# Patient Record
Sex: FEMALE | Race: WHITE | NOT HISPANIC OR LATINO | Employment: FULL TIME | URBAN - METROPOLITAN AREA
[De-identification: names, ages, dates, MRNs, and addresses within clinical notes are randomized per-mention and may not be internally consistent; named-entity substitution may affect disease eponyms.]

---

## 2017-04-22 ENCOUNTER — CONVERSION ENCOUNTER (OUTPATIENT)
Dept: FAMILY MEDICINE CLINIC | Facility: CLINIC | Age: 27
End: 2017-04-22

## 2017-04-22 LAB
ALBUMIN SERPL-MCNC: 4.5 G/DL (ref 3.6–5.1)
ALBUMIN/GLOB SERPL: ABNORMAL {RATIO} (ref 1–2.5)
ALP SERPL-CCNC: 348 UNITS/L (ref 33–115)
ALT SERPL-CCNC: 275 UNITS/L (ref 6–29)
AST SERPL-CCNC: 85 UNITS/L (ref 10–30)
BILIRUB SERPL-MCNC: 1 MG/DL (ref 0.2–1.2)
BUN SERPL-MCNC: 7 MG/DL (ref 7–25)
BUN/CREAT SERPL: ABNORMAL (ref 6–22)
CALCIUM SERPL-MCNC: 10 MG/DL (ref 8.6–10.2)
CHLORIDE SERPL-SCNC: 100 MMOL/L (ref 98–110)
CO2 CONTENT VENOUS: 29 MMOL/L (ref 20–31)
CONV TOTAL PROTEIN: 7.9 G/DL (ref 6.1–8.1)
CREAT UR-MCNC: 0.72 MG/DL (ref 0.5–1.1)
CRP SERPL-MCNC: 0.57 MG/DL
GLOBULIN UR ELPH-MCNC: ABNORMAL G/DL (ref 1.9–3.7)
GLUCOSE SERPL-MCNC: 110 MG/DL (ref 65–99)
POTASSIUM SERPL-SCNC: 4.8 MMOL/L (ref 3.5–5.3)
SODIUM SERPL-SCNC: 139 MMOL/L (ref 135–146)

## 2019-07-02 LAB
EXTERNAL HEPATITIS B SURFACE ANTIGEN: NEGATIVE
EXTERNAL HEPATITIS C AB: NORMAL
EXTERNAL RUBELLA QUALITATIVE: NORMAL
EXTERNAL SYPHILIS RPR SCREEN: NORMAL
HIV1 P24 AG SERPL QL IA: NORMAL

## 2020-01-15 LAB — EXTERNAL GROUP B STREP ANTIGEN: NORMAL

## 2020-02-04 ENCOUNTER — ANESTHESIA (OUTPATIENT)
Dept: LABOR AND DELIVERY | Facility: HOSPITAL | Age: 30
End: 2020-02-04

## 2020-02-04 ENCOUNTER — HOSPITAL ENCOUNTER (OUTPATIENT)
Dept: LABOR AND DELIVERY | Facility: HOSPITAL | Age: 30
Discharge: HOME | End: 2020-02-04

## 2020-02-04 ENCOUNTER — HOSPITAL ENCOUNTER (INPATIENT)
Facility: HOSPITAL | Age: 30
LOS: 3 days | Discharge: HOME | End: 2020-02-07
Attending: OBSTETRICS & GYNECOLOGY | Admitting: OBSTETRICS & GYNECOLOGY

## 2020-02-04 ENCOUNTER — ANESTHESIA EVENT (OUTPATIENT)
Dept: LABOR AND DELIVERY | Facility: HOSPITAL | Age: 30
End: 2020-02-04

## 2020-02-04 DIAGNOSIS — Z37.9 NORMAL LABOR: Primary | ICD-10-CM

## 2020-02-04 LAB
ABO GROUP BLD: NORMAL
BLD GP AB SCN SERPL QL: NEGATIVE
DEPRECATED RDW RBC AUTO: 49.3 FL (ref 37–54)
ERYTHROCYTE [DISTWIDTH] IN BLOOD BY AUTOMATED COUNT: 15.6 % (ref 12.3–15.4)
HCT VFR BLD AUTO: 35.9 % (ref 34–46.6)
HGB BLD-MCNC: 11.8 G/DL (ref 12–15.9)
MCH RBC QN AUTO: 28.5 PG (ref 26.6–33)
MCHC RBC AUTO-ENTMCNC: 32.9 G/DL (ref 31.5–35.7)
MCV RBC AUTO: 86.7 FL (ref 79–97)
PLATELET # BLD AUTO: 236 10*3/MM3 (ref 140–450)
PMV BLD AUTO: 9.9 FL (ref 6–12)
RBC # BLD AUTO: 4.14 10*6/MM3 (ref 3.77–5.28)
RH BLD: POSITIVE
T&S EXPIRATION DATE: NORMAL
WBC NRBC COR # BLD: 9.14 10*3/MM3 (ref 3.4–10.8)

## 2020-02-04 PROCEDURE — 86850 RBC ANTIBODY SCREEN: CPT | Performed by: OBSTETRICS & GYNECOLOGY

## 2020-02-04 PROCEDURE — 86901 BLOOD TYPING SEROLOGIC RH(D): CPT | Performed by: OBSTETRICS & GYNECOLOGY

## 2020-02-04 PROCEDURE — 85027 COMPLETE CBC AUTOMATED: CPT | Performed by: OBSTETRICS & GYNECOLOGY

## 2020-02-04 PROCEDURE — 86901 BLOOD TYPING SEROLOGIC RH(D): CPT

## 2020-02-04 PROCEDURE — 86920 COMPATIBILITY TEST SPIN: CPT

## 2020-02-04 PROCEDURE — 25010000002 VANCOMYCIN PER 500 MG: Performed by: OBSTETRICS & GYNECOLOGY

## 2020-02-04 PROCEDURE — 86900 BLOOD TYPING SEROLOGIC ABO: CPT | Performed by: OBSTETRICS & GYNECOLOGY

## 2020-02-04 PROCEDURE — 86900 BLOOD TYPING SEROLOGIC ABO: CPT

## 2020-02-04 RX ORDER — SODIUM CHLORIDE 0.9 % (FLUSH) 0.9 %
10 SYRINGE (ML) INJECTION AS NEEDED
Status: DISCONTINUED | OUTPATIENT
Start: 2020-02-04 | End: 2020-02-05

## 2020-02-04 RX ORDER — PRENATAL VIT NO.126/IRON/FOLIC 28MG-0.8MG
1 TABLET ORAL DAILY
COMMUNITY

## 2020-02-04 RX ORDER — SODIUM CHLORIDE, SODIUM LACTATE, POTASSIUM CHLORIDE, CALCIUM CHLORIDE 600; 310; 30; 20 MG/100ML; MG/100ML; MG/100ML; MG/100ML
125 INJECTION, SOLUTION INTRAVENOUS CONTINUOUS
Status: CANCELLED | OUTPATIENT
Start: 2020-02-04

## 2020-02-04 RX ORDER — ASPIRIN 81 MG/1
81 TABLET, CHEWABLE ORAL DAILY
COMMUNITY
End: 2020-02-07 | Stop reason: HOSPADM

## 2020-02-04 RX ORDER — VANCOMYCIN HYDROCHLORIDE 1 G/200ML
1000 INJECTION, SOLUTION INTRAVENOUS EVERY 12 HOURS
Status: COMPLETED | OUTPATIENT
Start: 2020-02-04 | End: 2020-02-05

## 2020-02-04 RX ORDER — OXYTOCIN-SODIUM CHLORIDE 0.9% IV SOLN 30 UNIT/500ML 30-0.9/5 UT/ML-%
1-30 SOLUTION INTRAVENOUS
Status: DISCONTINUED | OUTPATIENT
Start: 2020-02-04 | End: 2020-02-05

## 2020-02-04 RX ORDER — LIDOCAINE HYDROCHLORIDE 10 MG/ML
5 INJECTION, SOLUTION EPIDURAL; INFILTRATION; INTRACAUDAL; PERINEURAL AS NEEDED
Status: DISCONTINUED | OUTPATIENT
Start: 2020-02-04 | End: 2020-02-05

## 2020-02-04 RX ORDER — SODIUM CHLORIDE 0.9 % (FLUSH) 0.9 %
3 SYRINGE (ML) INJECTION EVERY 12 HOURS SCHEDULED
Status: DISCONTINUED | OUTPATIENT
Start: 2020-02-04 | End: 2020-02-05

## 2020-02-04 RX ORDER — SODIUM CHLORIDE, SODIUM LACTATE, POTASSIUM CHLORIDE, CALCIUM CHLORIDE 600; 310; 30; 20 MG/100ML; MG/100ML; MG/100ML; MG/100ML
125 INJECTION, SOLUTION INTRAVENOUS CONTINUOUS
Status: DISCONTINUED | OUTPATIENT
Start: 2020-02-04 | End: 2020-02-05

## 2020-02-04 RX ADMIN — OXYTOCIN 2 MILLI-UNITS/MIN: 10 INJECTION, SOLUTION INTRAMUSCULAR; INTRAVENOUS at 22:15

## 2020-02-04 RX ADMIN — SODIUM CHLORIDE, POTASSIUM CHLORIDE, SODIUM LACTATE AND CALCIUM CHLORIDE 1000 ML: 600; 310; 30; 20 INJECTION, SOLUTION INTRAVENOUS at 21:16

## 2020-02-04 RX ADMIN — SODIUM CHLORIDE, POTASSIUM CHLORIDE, SODIUM LACTATE AND CALCIUM CHLORIDE 125 ML/HR: 600; 310; 30; 20 INJECTION, SOLUTION INTRAVENOUS at 22:40

## 2020-02-04 RX ADMIN — VANCOMYCIN HYDROCHLORIDE 1000 MG: 1 INJECTION, SOLUTION INTRAVENOUS at 22:18

## 2020-02-05 LAB
APTT PPP: 25.2 SECONDS (ref 22.7–35.4)
BASOPHILS # BLD AUTO: 0.02 10*3/MM3 (ref 0–0.2)
BASOPHILS NFR BLD AUTO: 0.2 % (ref 0–1.5)
DEPRECATED RDW RBC AUTO: 47.9 FL (ref 37–54)
EOSINOPHIL # BLD AUTO: 0.02 10*3/MM3 (ref 0–0.4)
EOSINOPHIL NFR BLD AUTO: 0.2 % (ref 0.3–6.2)
ERYTHROCYTE [DISTWIDTH] IN BLOOD BY AUTOMATED COUNT: 15.2 % (ref 12.3–15.4)
FIBRINOGEN PPP-MCNC: 555 MG/DL (ref 219–464)
HCT VFR BLD AUTO: 33.6 % (ref 34–46.6)
HGB BLD-MCNC: 11.3 G/DL (ref 12–15.9)
IMM GRANULOCYTES # BLD AUTO: 0.28 10*3/MM3 (ref 0–0.05)
IMM GRANULOCYTES NFR BLD AUTO: 2.3 % (ref 0–0.5)
INR PPP: 0.93 (ref 0.9–1.1)
LYMPHOCYTES # BLD AUTO: 0.7 10*3/MM3 (ref 0.7–3.1)
LYMPHOCYTES NFR BLD AUTO: 5.8 % (ref 19.6–45.3)
MCH RBC QN AUTO: 29 PG (ref 26.6–33)
MCHC RBC AUTO-ENTMCNC: 33.6 G/DL (ref 31.5–35.7)
MCV RBC AUTO: 86.2 FL (ref 79–97)
MONOCYTES # BLD AUTO: 0.39 10*3/MM3 (ref 0.1–0.9)
MONOCYTES NFR BLD AUTO: 3.2 % (ref 5–12)
NEUTROPHILS # BLD AUTO: 10.69 10*3/MM3 (ref 1.7–7)
NEUTROPHILS NFR BLD AUTO: 88.3 % (ref 42.7–76)
NRBC BLD AUTO-RTO: 0.1 /100 WBC (ref 0–0.2)
PLATELET # BLD AUTO: 189 10*3/MM3 (ref 140–450)
PMV BLD AUTO: 9.8 FL (ref 6–12)
PROTHROMBIN TIME: 12.2 SECONDS (ref 11.7–14.2)
RBC # BLD AUTO: 3.9 10*6/MM3 (ref 3.77–5.28)
WBC NRBC COR # BLD: 12.1 10*3/MM3 (ref 3.4–10.8)

## 2020-02-05 PROCEDURE — 25010000002 METHYLERGONOVINE MALEATE PER 0.2 MG: Performed by: OBSTETRICS & GYNECOLOGY

## 2020-02-05 PROCEDURE — 86900 BLOOD TYPING SEROLOGIC ABO: CPT

## 2020-02-05 PROCEDURE — 85610 PROTHROMBIN TIME: CPT | Performed by: OBSTETRICS & GYNECOLOGY

## 2020-02-05 PROCEDURE — 0KQM0ZZ REPAIR PERINEUM MUSCLE, OPEN APPROACH: ICD-10-PCS | Performed by: OBSTETRICS & GYNECOLOGY

## 2020-02-05 PROCEDURE — C1755 CATHETER, INTRASPINAL: HCPCS | Performed by: ANESTHESIOLOGY

## 2020-02-05 PROCEDURE — 25010000002 VANCOMYCIN PER 500 MG: Performed by: OBSTETRICS & GYNECOLOGY

## 2020-02-05 PROCEDURE — 25010000002 MORPHINE PER 10 MG

## 2020-02-05 PROCEDURE — 10907ZC DRAINAGE OF AMNIOTIC FLUID, THERAPEUTIC FROM PRODUCTS OF CONCEPTION, VIA NATURAL OR ARTIFICIAL OPENING: ICD-10-PCS | Performed by: OBSTETRICS & GYNECOLOGY

## 2020-02-05 PROCEDURE — 85025 COMPLETE CBC W/AUTO DIFF WBC: CPT | Performed by: OBSTETRICS & GYNECOLOGY

## 2020-02-05 PROCEDURE — C1755 CATHETER, INTRASPINAL: HCPCS

## 2020-02-05 PROCEDURE — 85384 FIBRINOGEN ACTIVITY: CPT | Performed by: OBSTETRICS & GYNECOLOGY

## 2020-02-05 PROCEDURE — 85730 THROMBOPLASTIN TIME PARTIAL: CPT | Performed by: OBSTETRICS & GYNECOLOGY

## 2020-02-05 PROCEDURE — 86901 BLOOD TYPING SEROLOGIC RH(D): CPT

## 2020-02-05 RX ORDER — PROMETHAZINE HYDROCHLORIDE 25 MG/1
12.5 SUPPOSITORY RECTAL EVERY 6 HOURS PRN
Status: DISCONTINUED | OUTPATIENT
Start: 2020-02-05 | End: 2020-02-07 | Stop reason: HOSPADM

## 2020-02-05 RX ORDER — IBUPROFEN 800 MG/1
800 TABLET ORAL EVERY 8 HOURS PRN
Status: DISCONTINUED | OUTPATIENT
Start: 2020-02-05 | End: 2020-02-07 | Stop reason: HOSPADM

## 2020-02-05 RX ORDER — OXYCODONE AND ACETAMINOPHEN 10; 325 MG/1; MG/1
1 TABLET ORAL EVERY 4 HOURS PRN
Status: DISCONTINUED | OUTPATIENT
Start: 2020-02-05 | End: 2020-02-07 | Stop reason: HOSPADM

## 2020-02-05 RX ORDER — MISOPROSTOL 200 UG/1
800 TABLET ORAL AS NEEDED
Status: DISCONTINUED | OUTPATIENT
Start: 2020-02-05 | End: 2020-02-05

## 2020-02-05 RX ORDER — LIDOCAINE HYDROCHLORIDE 10 MG/ML
10 INJECTION, SOLUTION INFILTRATION; PERINEURAL ONCE
Status: COMPLETED | OUTPATIENT
Start: 2020-02-05 | End: 2020-02-05

## 2020-02-05 RX ORDER — VANCOMYCIN HYDROCHLORIDE 1 G/200ML
1000 INJECTION, SOLUTION INTRAVENOUS ONCE
Status: COMPLETED | OUTPATIENT
Start: 2020-02-05 | End: 2020-02-05

## 2020-02-05 RX ORDER — TRANEXAMIC ACID 100 MG/ML
INJECTION, SOLUTION INTRAVENOUS
Status: COMPLETED
Start: 2020-02-05 | End: 2020-02-05

## 2020-02-05 RX ORDER — ONDANSETRON 4 MG/1
4 TABLET, FILM COATED ORAL EVERY 8 HOURS PRN
Status: DISCONTINUED | OUTPATIENT
Start: 2020-02-05 | End: 2020-02-07 | Stop reason: HOSPADM

## 2020-02-05 RX ORDER — ONDANSETRON 2 MG/ML
4 INJECTION INTRAMUSCULAR; INTRAVENOUS ONCE AS NEEDED
Status: DISCONTINUED | OUTPATIENT
Start: 2020-02-05 | End: 2020-02-05

## 2020-02-05 RX ORDER — ERYTHROMYCIN 5 MG/G
OINTMENT OPHTHALMIC
Status: DISPENSED
Start: 2020-02-05 | End: 2020-02-05

## 2020-02-05 RX ORDER — HYDROXYZINE 50 MG/1
50 TABLET, FILM COATED ORAL NIGHTLY PRN
Status: DISCONTINUED | OUTPATIENT
Start: 2020-02-05 | End: 2020-02-07 | Stop reason: HOSPADM

## 2020-02-05 RX ORDER — EPHEDRINE SULFATE 50 MG/ML
5 INJECTION, SOLUTION INTRAVENOUS AS NEEDED
Status: DISCONTINUED | OUTPATIENT
Start: 2020-02-05 | End: 2020-02-05

## 2020-02-05 RX ORDER — PHYTONADIONE 1 MG/.5ML
INJECTION, EMULSION INTRAMUSCULAR; INTRAVENOUS; SUBCUTANEOUS
Status: DISPENSED
Start: 2020-02-05 | End: 2020-02-05

## 2020-02-05 RX ORDER — OXYTOCIN-SODIUM CHLORIDE 0.9% IV SOLN 30 UNIT/500ML 30-0.9/5 UT/ML-%
999 SOLUTION INTRAVENOUS ONCE
Status: DISCONTINUED | OUTPATIENT
Start: 2020-02-05 | End: 2020-02-05 | Stop reason: HOSPADM

## 2020-02-05 RX ORDER — TRANEXAMIC ACID 100 MG/ML
1000 INJECTION, SOLUTION INTRAVENOUS ONCE
Status: COMPLETED | OUTPATIENT
Start: 2020-02-05 | End: 2020-02-05

## 2020-02-05 RX ORDER — OXYTOCIN-SODIUM CHLORIDE 0.9% IV SOLN 30 UNIT/500ML 30-0.9/5 UT/ML-%
250 SOLUTION INTRAVENOUS CONTINUOUS
Status: ACTIVE | OUTPATIENT
Start: 2020-02-05 | End: 2020-02-05

## 2020-02-05 RX ORDER — ONDANSETRON 2 MG/ML
4 INJECTION INTRAMUSCULAR; INTRAVENOUS EVERY 6 HOURS PRN
Status: DISCONTINUED | OUTPATIENT
Start: 2020-02-05 | End: 2020-02-07 | Stop reason: HOSPADM

## 2020-02-05 RX ORDER — MORPHINE SULFATE 2 MG/ML
INJECTION, SOLUTION INTRAMUSCULAR; INTRAVENOUS
Status: COMPLETED
Start: 2020-02-05 | End: 2020-02-05

## 2020-02-05 RX ORDER — DIPHENHYDRAMINE HYDROCHLORIDE 50 MG/ML
12.5 INJECTION INTRAMUSCULAR; INTRAVENOUS EVERY 8 HOURS PRN
Status: DISCONTINUED | OUTPATIENT
Start: 2020-02-05 | End: 2020-02-05

## 2020-02-05 RX ORDER — CARBOPROST TROMETHAMINE 250 UG/ML
250 INJECTION, SOLUTION INTRAMUSCULAR AS NEEDED
Status: DISCONTINUED | OUTPATIENT
Start: 2020-02-05 | End: 2020-02-05

## 2020-02-05 RX ORDER — METHYLERGONOVINE MALEATE 0.2 MG/ML
200 INJECTION INTRAVENOUS ONCE AS NEEDED
Status: COMPLETED | OUTPATIENT
Start: 2020-02-05 | End: 2020-02-05

## 2020-02-05 RX ORDER — PROMETHAZINE HYDROCHLORIDE 25 MG/ML
12.5 INJECTION, SOLUTION INTRAMUSCULAR; INTRAVENOUS EVERY 6 HOURS PRN
Status: DISCONTINUED | OUTPATIENT
Start: 2020-02-05 | End: 2020-02-07 | Stop reason: HOSPADM

## 2020-02-05 RX ORDER — LANOLIN
CREAM (ML) TOPICAL
Status: DISCONTINUED | OUTPATIENT
Start: 2020-02-05 | End: 2020-02-07 | Stop reason: HOSPADM

## 2020-02-05 RX ORDER — DOCUSATE SODIUM 100 MG/1
100 CAPSULE, LIQUID FILLED ORAL 2 TIMES DAILY
Status: DISCONTINUED | OUTPATIENT
Start: 2020-02-05 | End: 2020-02-07 | Stop reason: HOSPADM

## 2020-02-05 RX ORDER — CALCIUM CARBONATE 200(500)MG
2 TABLET,CHEWABLE ORAL 3 TIMES DAILY PRN
Status: DISCONTINUED | OUTPATIENT
Start: 2020-02-05 | End: 2020-02-07 | Stop reason: HOSPADM

## 2020-02-05 RX ORDER — MORPHINE SULFATE 1 MG/ML
1 INJECTION, SOLUTION EPIDURAL; INTRATHECAL; INTRAVENOUS ONCE
Status: DISCONTINUED | OUTPATIENT
Start: 2020-02-05 | End: 2020-02-05

## 2020-02-05 RX ORDER — MISOPROSTOL 200 UG/1
600 TABLET ORAL ONCE AS NEEDED
Status: DISCONTINUED | OUTPATIENT
Start: 2020-02-05 | End: 2020-02-07 | Stop reason: HOSPADM

## 2020-02-05 RX ORDER — OXYCODONE HYDROCHLORIDE AND ACETAMINOPHEN 5; 325 MG/1; MG/1
1 TABLET ORAL EVERY 4 HOURS PRN
Status: DISCONTINUED | OUTPATIENT
Start: 2020-02-05 | End: 2020-02-07 | Stop reason: HOSPADM

## 2020-02-05 RX ORDER — PROMETHAZINE HYDROCHLORIDE 25 MG/1
25 TABLET ORAL EVERY 6 HOURS PRN
Status: DISCONTINUED | OUTPATIENT
Start: 2020-02-05 | End: 2020-02-07 | Stop reason: HOSPADM

## 2020-02-05 RX ORDER — FAMOTIDINE 10 MG/ML
20 INJECTION, SOLUTION INTRAVENOUS ONCE AS NEEDED
Status: DISCONTINUED | OUTPATIENT
Start: 2020-02-05 | End: 2020-02-05

## 2020-02-05 RX ORDER — OXYTOCIN-SODIUM CHLORIDE 0.9% IV SOLN 30 UNIT/500ML 30-0.9/5 UT/ML-%
125 SOLUTION INTRAVENOUS CONTINUOUS PRN
Status: COMPLETED | OUTPATIENT
Start: 2020-02-05 | End: 2020-02-05

## 2020-02-05 RX ORDER — MORPHINE SULFATE 2 MG/ML
2 INJECTION, SOLUTION INTRAMUSCULAR; INTRAVENOUS ONCE
Status: COMPLETED | OUTPATIENT
Start: 2020-02-05 | End: 2020-02-05

## 2020-02-05 RX ADMIN — IBUPROFEN 800 MG: 800 TABLET, FILM COATED ORAL at 22:04

## 2020-02-05 RX ADMIN — Medication: at 19:11

## 2020-02-05 RX ADMIN — MISOPROSTOL 800 MCG: 200 TABLET ORAL at 11:45

## 2020-02-05 RX ADMIN — SODIUM CHLORIDE, POTASSIUM CHLORIDE, SODIUM LACTATE AND CALCIUM CHLORIDE 999 ML/HR: 600; 310; 30; 20 INJECTION, SOLUTION INTRAVENOUS at 12:42

## 2020-02-05 RX ADMIN — Medication 10 ML/HR: at 05:14

## 2020-02-05 RX ADMIN — OXYCODONE AND ACETAMINOPHEN 1 TABLET: 5; 325 TABLET ORAL at 14:54

## 2020-02-05 RX ADMIN — TRANEXAMIC ACID 1000 MG: 100 INJECTION, SOLUTION INTRAVENOUS at 12:52

## 2020-02-05 RX ADMIN — SODIUM CHLORIDE, POTASSIUM CHLORIDE, SODIUM LACTATE AND CALCIUM CHLORIDE 125 ML/HR: 600; 310; 30; 20 INJECTION, SOLUTION INTRAVENOUS at 05:41

## 2020-02-05 RX ADMIN — MORPHINE SULFATE 1 MG: 2 INJECTION, SOLUTION INTRAMUSCULAR; INTRAVENOUS at 12:45

## 2020-02-05 RX ADMIN — OXYCODONE AND ACETAMINOPHEN 1 TABLET: 5; 325 TABLET ORAL at 19:11

## 2020-02-05 RX ADMIN — VANCOMYCIN HYDROCHLORIDE 1000 MG: 1 INJECTION, SOLUTION INTRAVENOUS at 10:34

## 2020-02-05 RX ADMIN — OXYTOCIN 250 ML/HR: 10 INJECTION, SOLUTION INTRAMUSCULAR; INTRAVENOUS at 12:48

## 2020-02-05 RX ADMIN — OXYTOCIN 999 ML/HR: 10 INJECTION INTRAVENOUS at 11:10

## 2020-02-05 RX ADMIN — Medication: at 19:10

## 2020-02-05 RX ADMIN — METHYLERGONOVINE MALEATE 200 MCG: 0.2 INJECTION, SOLUTION INTRAMUSCULAR; INTRAVENOUS at 11:06

## 2020-02-05 RX ADMIN — SODIUM CHLORIDE, POTASSIUM CHLORIDE, SODIUM LACTATE AND CALCIUM CHLORIDE 999 ML/HR: 600; 310; 30; 20 INJECTION, SOLUTION INTRAVENOUS at 04:35

## 2020-02-05 RX ADMIN — OXYCODONE AND ACETAMINOPHEN 1 TABLET: 5; 325 TABLET ORAL at 23:02

## 2020-02-05 RX ADMIN — IBUPROFEN 800 MG: 800 TABLET, FILM COATED ORAL at 13:40

## 2020-02-05 RX ADMIN — VANCOMYCIN HYDROCHLORIDE 1000 MG: 1 INJECTION, SOLUTION INTRAVENOUS at 23:02

## 2020-02-05 RX ADMIN — TRANEXAMIC ACID 1000 MG: 1 INJECTION, SOLUTION INTRAVENOUS at 12:52

## 2020-02-05 RX ADMIN — LIDOCAINE HYDROCHLORIDE 10 ML: 10 INJECTION, SOLUTION EPIDURAL; INFILTRATION; INTRACAUDAL; PERINEURAL at 12:56

## 2020-02-05 NOTE — PLAN OF CARE
Problem: Patient Care Overview  Goal: Plan of Care Review  Outcome: Ongoing (interventions implemented as appropriate)  Flowsheets (Taken 2/5/2020 0613)  Progress: improving  Plan of Care Reviewed With: patient; spouse; family     Problem: Patient Care Overview  Goal: Individualization and Mutuality  Outcome: Ongoing (interventions implemented as appropriate)  Flowsheets (Taken 2/5/2020 0613)  Patient Specific Goals (Include Timeframe): vaginal delivery within 24 hours of admission; epidural upon request; manny immediately post delivery; breastfeeding within 1 hour of delivery  How to Address Anxieties/Fears: Establish trusting relationship with pt and family; listen to concerns and be sensitive to fears  Patient Specific Interventions: frequent position changes; oxytocin administration; breastfeeding assistance if needed  What Anxieties, Fears, Concerns, or Questions Do You Have About Your Care?: Near death experience 10 days PP with first pregnancy  Patient Specific Preferences: vaginal delivery; pain control; manny; breastfeeding; healthy mom and baby     Problem: Patient Care Overview  Goal: Discharge Needs Assessment  Outcome: Ongoing (interventions implemented as appropriate)  Flowsheets  Taken 2/5/2020 0613 by Evy Hylton RN  Equipment Needed After Discharge: none  Anticipated Changes Related to Illness: none  Transportation Concerns: car, none  Concerns to be Addressed: no discharge needs identified  Readmission Within the Last 30 Days: no previous admission in last 30 days  Taken 2/4/2020 2226 by Sarai Arredondo, RN  Equipment Currently Used at Home: none  Taken 2/4/2020 2224 by Sarai Arredondo, RN  Transportation Anticipated: family or friend will provide  Patient/Family Anticipated Services at Transition: none  Patient/Family Anticipates Transition to: home     Problem: Patient Care Overview  Goal: Interprofessional Rounds/Family Conf  Outcome: Ongoing (interventions implemented as  appropriate)  Flowsheets (Taken 2020)  Participants: family; nursing; patient; physician     Problem: Labor (Cervical Ripen, Induct, Augment) (Adult,Obstetrics,Pediatric)  Goal: Signs and Symptoms of Listed Potential Problems Will be Absent, Minimized or Managed (Labor)  Outcome: Ongoing (interventions implemented as appropriate)  Flowsheets (Taken 2020)  Problems Assessed (Labor): all  Problems Present (Labor): none     Problem: Fall Risk,  (Adult,Obstetrics,Pediatric)  Goal: Identify Related Risk Factors and Signs and Symptoms  Outcome: Ongoing (interventions implemented as appropriate)  Flowsheets (Taken 2020)  Related Risk Factors (Fall Risk, ): regional anesthesia  Signs and Symptoms (Fall Risk, ): presence of fall risk factors     Problem: Fall Risk,  (Adult,Obstetrics,Pediatric)  Goal: Absence of Maternal Fall  Outcome: Ongoing (interventions implemented as appropriate)  Flowsheets (Taken 2020)  Absence of Maternal Fall: achieves outcome     Problem: Fall Risk,  (Adult,Obstetrics,Pediatric)  Goal: Absence of  Fall/Drop  Outcome: Ongoing (interventions implemented as appropriate)  Flowsheets (Taken 2020)  Absence of [place] Fall/Drop: achieves outcome     Problem: Skin Injury Risk (Adult)  Goal: Identify Related Risk Factors and Signs and Symptoms  Outcome: Ongoing (interventions implemented as appropriate)  Flowsheets (Taken 2020)  Related Risk Factors (Skin Injury Risk): mobility impaired     Problem: Skin Injury Risk (Adult)  Goal: Skin Health and Integrity  Outcome: Ongoing (interventions implemented as appropriate)  Flowsheets (Taken 2020)  Skin Health and Integrity: achieves outcome     Problem: Anesthesia/Analgesia, Neuraxial (Obstetrics)  Goal: Signs and Symptoms of Listed Potential Problems Will be Absent, Minimized or Managed (Anesthesia/Analgesia, Neuraxial)  Outcome: Ongoing  (interventions implemented as appropriate)  Flowsheets (Taken 2/5/2020 9008)  Problems Assessed (Neuraxial Anesthesia/Analgesia, OB): all  Problems Present (Neuraxial Anesth OB): none

## 2020-02-05 NOTE — ANESTHESIA PROCEDURE NOTES
Labor Epidural      Patient reassessed immediately prior to procedure    Patient location during procedure: OB  Start Time: 2/5/2020 4:53 AM  Stop Time: 2/5/2020 5:13 AM  Indication:at surgeon's request  Performed By  Anesthesiologist: Bradly Sarabia MD  Preanesthetic Checklist  Completed: patient identified, site marked, surgical consent, pre-op evaluation, timeout performed, IV checked, risks and benefits discussed and monitors and equipment checked  Prep:  Pt Position:sitting  Sterile Tech:gloves, cap, sterile barrier and mask  Prep:chlorhexidine gluconate and isopropyl alcohol  Monitoring:continuous pulse oximetry, EKG and blood pressure monitoring  Epidural Block Procedure:  Approach:midline  Guidance:landmark technique  Location:L3-L4  Needle Type:Tuohy  Needle Gauge:19 G  Loss of Resistance Medium: saline  Loss of Resistance: 8cm  Cath Depth at skin:13 cm  Paresthesia: none  Aspiration:negative  Test Dose:negative  Number of Attempts: 2  Post Assessment:  Dressing:secured with tape and occlusive dressing applied  Pt Tolerance:patient tolerated the procedure well with no apparent complications  Complications:no

## 2020-02-05 NOTE — H&P
[facility]  Obstetric History and Physical    Patient Name: Claudia Sierra  :  1990  MRN:  [id number]      No chief complaint on file.      Subjective     Patient is a 29 y.o. female  at 39wks presents for induction secondary to favorable cx at term. Her son's delivery was complicated by a readmission for presumed endometritis and bacterial menigitis.  She presented with seizures and was in a medically induced coma.  This pregnancy has been uncomplicated.  She is very nervous about infection. Her son is healthy.  This is a female. She is gbs positive and it is resistant to emycin/clinda. She is allergic to pcn. Will use vanc.  She had a fourth deg lac with her first.       Objective       Vital Signs Range for the last 24 hours  Temperature:     Temp Source:     BP:     Pulse:     Respirations:                     Physical Examination:     General :  Alert in NAD  Abdomen: Gravid, EFW 7-8pds by leopolds    Presentation:    Cervix: Exam by:     Dilation:     Effacement:     Station:         Fetal Heart Rate Assessment   Method:     Beats/min:     Baseline:     Varibility:     Accels:     Decels:     Tracing Category:       Uterine Assessment   Method:     Frequency (min):     Ctx Count in 10 min:     Duration:     Intensity:     Intensity by IUPC:     Resting Tone:     Resting Tone by IUPC:     [place] Units:                 Assessment/Plan     1.  Intrauterine pregnancy at Unknown weeks gestation for induction with pitocin. Will start vanc for gbs positive.    reactive, reassuring fetal status.   Continue pitocin. Anticipate .  Plan of care has been reviewed with patient and family.  All questions answered.      Normal labor        H&P updated. The patient was examined and the following changes are noted above.         Che Mosher MD  2020  9:25 PM

## 2020-02-05 NOTE — NURSING NOTE
Pt standing at the bs to hopefully allow gravity to help baby come down in her pelvis.  Position may also help alleviate some of her back pain.  RN will check back in a few minutes and see about repositioning again.  DR. Mosher will come reasses at 0430 to see if she is able to break water at that time.

## 2020-02-05 NOTE — ANESTHESIA PREPROCEDURE EVALUATION
" Anesthesia Evaluation     Patient summary reviewed and Nursing notes reviewed   no history of anesthetic complications:  NPO Solid Status: > 8 hours  NPO Liquid Status: > 2 hours           Airway   Mallampati: II  TM distance: >3 FB  Neck ROM: full  No difficulty expected  Dental - normal exam     Pulmonary     breath sounds clear to auscultation  Cardiovascular   Exercise tolerance: good (4-7 METS)    Rhythm: regular  Rate: normal        Neuro/Psych  (+) seizures, CVA, headaches,       ROS Comment: \"Brain bleed\" following prior delivery, no residual defects  GI/Hepatic/Renal/Endo      Musculoskeletal     Abdominal     Abdomen: soft.   Substance History      OB/GYN    (+) Pregnant,         Other                      Anesthesia Plan    ASA 2     epidural   (39w2d)    Anesthetic plan, all risks, benefits, and alternatives have been provided, discussed and informed consent has been obtained with: patient.      "

## 2020-02-05 NOTE — L&D DELIVERY NOTE
[facility]  Vaginal Delivery Note    Patient Name: Claudia Sierra  :  1990  MRN:  [id number]      Delivery     Delivery: Vaginal, Spontaneous     YOB: 2021    Time of Birth: 10:05 AM      Anesthesia: Epidural     Delivering clinician: Che Mosher MD       Infant    Findings: Viable female  infant    Infant observations: Weight: No birth weight on file.   Observations/Comments:  scale 4      Apgars: 9   @ 1 minute /    9   @ 5 minutes     Placenta, Cord, and Fluid    Placenta delivered  Spontaneous   at  2020 10:13 AM     Cord: 3 vessels  present.   Cord blood obtained: Yes    Cord gases obtained:  No      Repair    Episiotomy: No   Lacerations: Small second deg   Estimated Blood Loss:    300 mls.       Delivery narrative: The patient is a 29 y.o.  at 39w2d.  Presented for induction secondary to favorable cx at term. Pitocin was begun. arom performed. She received vancomycin for gbs positive status. She received 2doses. She had an epidural which worked well throughout. Progressed normally to C/C/+1 . There was a loose nuchal cord that was easily reduced.. Fetal status reassuring throughout.  of viable female infant  @ 10:05 AM  over an intact perineum. ASDE. No birth weight on file. .  Placenta delivered spontaneously, intact with 3 vessel cord. Cervix and rectum intact. second degree laceration repaired in usual fashion with 3-0vicryl suture. Mother and baby recovering good condition.       Che Mosher MD  20  10:36 AM

## 2020-02-05 NOTE — PROGRESS NOTES
CTSP around 1pm for continued bleeding.  Had received pitocin, methergine x 1, and cytotec 800mcg [place].  Total EBL at that time about 700cc.  Exam done and bladder noted to be distended.  Mccurdy catheter placed with 900cc urine returned. Uterus firm, no significant clot in TIGIST on bimanual.  Vaginal repair site was noted to be oozy with continued bleeding so several figure-of-eight 2-0 vicryl sutures placed to reinforce with good effect.  Bleeding significantly slowed.  TXA given as well.  VSS throughout.  2nd IV placed and labs drawn which returned stable, coags normal. Packing placed, will be removed in several hours prior to transfer to postpartum.    Umm Robertson MD

## 2020-02-05 NOTE — NURSING NOTE
1005 vaginal delivery,  at this time, 2nd degree lac repaired  1050 uterus boggy, firm with massage, heavy bleeding noted  1105 uterus firm, bleeding continues to be heavy  1106 methergine given 200mcg IM  1110 pitocin bolusing  1120 light bleeding noted on fundal exam. Dr. Robertson notified of methergine given, next check better.  1130 pitocin turned back down to 125  1135 heavy bleeding noted again during fundal check  1145 cytotec 800mcg placed rectally  1150 light bleeding noted  1205 bleeding continues to be light, MD notified of cytotec given and 2 light checks after, plan to check one more time and transfer pt to MB  1220 heavy bleeding noted again and large clot on pad. Dr. Robertson notified, measured 772 total blood loss at this time. Putting SpO2 on pt.  1242 MD @ BS, LR bolusing, MD assessed clot. Pt placed in stirrups. MD attempted to perform uterine sweep and full bladder prevented her. MD pressed on bladder and expressed approx. 30cc of urine.  1252 oneil placed, TXA 1gm started at this time. MD placing another stitch in 2nd degree lac.  1309 2nd IV placed, labs sent.  1312 vaginal packing placed, bleeding better, LR slowed to 125ml/hr.  Total blood loss from delivery and recovery is 822 at this time.    1440 vaginal packing removed, pt continues to have scant bleeding

## 2020-02-06 LAB
BASOPHILS # BLD AUTO: 0.02 10*3/MM3 (ref 0–0.2)
BASOPHILS NFR BLD AUTO: 0.3 % (ref 0–1.5)
DEPRECATED RDW RBC AUTO: 47.2 FL (ref 37–54)
EOSINOPHIL # BLD AUTO: 0.17 10*3/MM3 (ref 0–0.4)
EOSINOPHIL NFR BLD AUTO: 2.1 % (ref 0.3–6.2)
ERYTHROCYTE [DISTWIDTH] IN BLOOD BY AUTOMATED COUNT: 15.2 % (ref 12.3–15.4)
HCT VFR BLD AUTO: 28 % (ref 34–46.6)
HGB BLD-MCNC: 9.5 G/DL (ref 12–15.9)
IMM GRANULOCYTES # BLD AUTO: 0.36 10*3/MM3 (ref 0–0.05)
IMM GRANULOCYTES NFR BLD AUTO: 4.5 % (ref 0–0.5)
LYMPHOCYTES # BLD AUTO: 0.82 10*3/MM3 (ref 0.7–3.1)
LYMPHOCYTES NFR BLD AUTO: 10.3 % (ref 19.6–45.3)
MCH RBC QN AUTO: 29.1 PG (ref 26.6–33)
MCHC RBC AUTO-ENTMCNC: 33.9 G/DL (ref 31.5–35.7)
MCV RBC AUTO: 85.6 FL (ref 79–97)
MONOCYTES # BLD AUTO: 0.57 10*3/MM3 (ref 0.1–0.9)
MONOCYTES NFR BLD AUTO: 7.2 % (ref 5–12)
NEUTROPHILS # BLD AUTO: 6 10*3/MM3 (ref 1.7–7)
NEUTROPHILS NFR BLD AUTO: 75.6 % (ref 42.7–76)
NRBC BLD AUTO-RTO: 0.1 /100 WBC (ref 0–0.2)
PLATELET # BLD AUTO: 162 10*3/MM3 (ref 140–450)
PMV BLD AUTO: 9.9 FL (ref 6–12)
RBC # BLD AUTO: 3.27 10*6/MM3 (ref 3.77–5.28)
WBC NRBC COR # BLD: 7.94 10*3/MM3 (ref 3.4–10.8)

## 2020-02-06 PROCEDURE — 85025 COMPLETE CBC W/AUTO DIFF WBC: CPT | Performed by: OBSTETRICS & GYNECOLOGY

## 2020-02-06 RX ORDER — SIMETHICONE 80 MG
80 TABLET,CHEWABLE ORAL 4 TIMES DAILY PRN
Status: DISCONTINUED | OUTPATIENT
Start: 2020-02-06 | End: 2020-02-07 | Stop reason: HOSPADM

## 2020-02-06 RX ADMIN — SIMETHICONE CHEW TAB 80 MG 80 MG: 80 TABLET ORAL at 18:22

## 2020-02-06 RX ADMIN — IBUPROFEN 800 MG: 800 TABLET, FILM COATED ORAL at 14:38

## 2020-02-06 RX ADMIN — OXYCODONE HYDROCHLORIDE AND ACETAMINOPHEN 1 TABLET: 10; 325 TABLET ORAL at 18:16

## 2020-02-06 RX ADMIN — OXYCODONE HYDROCHLORIDE AND ACETAMINOPHEN 1 TABLET: 10; 325 TABLET ORAL at 07:52

## 2020-02-06 RX ADMIN — BENZOCAINE 1 APPLICATION: 5.6 OINTMENT TOPICAL at 03:10

## 2020-02-06 RX ADMIN — IBUPROFEN 800 MG: 800 TABLET, FILM COATED ORAL at 22:11

## 2020-02-06 RX ADMIN — OXYCODONE AND ACETAMINOPHEN 1 TABLET: 5; 325 TABLET ORAL at 03:10

## 2020-02-06 RX ADMIN — OXYCODONE HYDROCHLORIDE AND ACETAMINOPHEN 1 TABLET: 10; 325 TABLET ORAL at 22:11

## 2020-02-06 RX ADMIN — OXYCODONE HYDROCHLORIDE AND ACETAMINOPHEN 1 TABLET: 10; 325 TABLET ORAL at 12:17

## 2020-02-06 RX ADMIN — DOCUSATE SODIUM 100 MG: 100 CAPSULE, LIQUID FILLED ORAL at 07:53

## 2020-02-06 RX ADMIN — IBUPROFEN 800 MG: 800 TABLET, FILM COATED ORAL at 05:38

## 2020-02-06 NOTE — PROGRESS NOTES
[facility]  Vaginal Delivery Progress Note    Patient Name: Claudia Sierra  :  1990  MRN:  [id number]      Subjective   Postpartum Day 1: Vaginal Delivery of a female infant.     The patient feels well without complaints.  Her pain is well controlled.  Reports normal lochia.     The patient plans to breastfeed.    Objective     Vital Signs Range for the last 24 hours  Temperature: Temp:  [97.2 °F (36.2 °C)-100.4 °F (38 °C)] 97.6 °F (36.4 °C)       BP: BP: ()/(58-84) 104/68   Pulse: Heart Rate:  [] 68   Respirations: Resp:  [16-20] 16                       Physical Exam:  General: Awake and alert  Abdomen: Fundus: firm, non tender, 1 below umbilicus  Extremities:  trace edema, NT     Labs:     Results from last 7 days   Lab Units 20  0532 20  1310 20  2115   WBC 10*3/mm3 7.94 12.10* 9.14   HEMOGLOBIN g/dL 9.5* 11.3* 11.8*   HEMATOCRIT % 28.0* 33.6* 35.9   PLATELETS 10*3/mm3 162 189 236       Prenatal labs results reviewed:  Yes   Rubella:  Immune  Rh Status:    RH type   Date Value Ref Range Status   2020 Positive  Final         Assessment/Plan  : 1. PPD1 S/P  - Doing well, continue usual cares.     PPH-- hgb stable.  Not lightheaded or dizzy.  Minimal lochia now.  Fundus firm.  D/w fe on d/c          Normal labor          Araceli Workman, APRN  2020  10:52 AM

## 2020-02-06 NOTE — LACTATION NOTE
P2T, baby on bili lights. Mom says baby is very sleepy and takes almost one hour to get aroused for feedings but once she is awake she does latch deeply. Mom has been hand expressing with feedings and spoon feeding baby. Encouraged to continue the hard work. She does have a personal breast pump at home.

## 2020-02-06 NOTE — LACTATION NOTE
P2. Patient was extremely ill with bacterial meningitis and a brain bleed after first child was born in 2016. This baby was a rapid delivery with resulting facial bruising.  Baby nursed x 1 hour in L&D and nursed well per mom at four p.m.  Baby very sleepy now so we hand expressed and fed baby one cc of colostrum and what was left in the teaspoon.  Patient very easy to express and feels baby latches very well when awake.   Lactation Consult Note    Evaluation Completed: 2020 8:28 PM  Patient Name: Claudia Sierra  :  1990  MRN:  [id number]     REFERRAL  INFORMATION:                          Date of Referral: 20   Person Making Referral: patient  Maternal Reason for Referral: breastfeeding currently       DELIVERY HISTORY:          Skin to skin initiation date/time: 2020  10:06 AM   Skin to skin end date/time:              MATERNAL ASSESSMENT:  Breast Size Issue: none (20 2000 : Becca Garcia RN)  Breast Shape: pendulous, round (20 2000 : Becca Garcia RN)  Breast Density: soft (20 2000 : Becca Garcia RN)  Areola: elastic (20 2000 : Becca Garcia RN)  Nipples: everted, graspable (20 2000 : Becca Garcia RN)                INFANT ASSESSMENT:  Information for the patient's :  Tia Sierra [[id number]]   No past medical history on file.                                                                                                                                MATERNAL INFANT FEEDING:  Maternal Preparation: breast care, hand hygiene (20 2000 : Becca Garcia RN)  Maternal Emotional State: assist needed (20 2000 : Becca Garcia RN)                     Milk Ejection Reflex: present (20 2000 : Becca Garcia RN)                                           EQUIPMENT TYPE:                                 BREAST PUMPING:          LACTATION REFERRALS:

## 2020-02-07 VITALS
HEIGHT: 62 IN | WEIGHT: 194.2 LBS | TEMPERATURE: 97.9 F | RESPIRATION RATE: 16 BRPM | HEART RATE: 71 BPM | BODY MASS INDEX: 35.74 KG/M2 | DIASTOLIC BLOOD PRESSURE: 75 MMHG | SYSTOLIC BLOOD PRESSURE: 111 MMHG | OXYGEN SATURATION: 98 %

## 2020-02-07 RX ORDER — OXYCODONE HYDROCHLORIDE AND ACETAMINOPHEN 5; 325 MG/1; MG/1
1 TABLET ORAL EVERY 4 HOURS PRN
Qty: 10 TABLET | Refills: 0 | Status: SHIPPED | OUTPATIENT
Start: 2020-02-07 | End: 2020-02-15

## 2020-02-07 RX ORDER — IBUPROFEN 800 MG/1
800 TABLET ORAL EVERY 8 HOURS PRN
Qty: 30 TABLET | Refills: 0 | Status: SHIPPED | OUTPATIENT
Start: 2020-02-07

## 2020-02-07 RX ADMIN — IBUPROFEN 800 MG: 800 TABLET, FILM COATED ORAL at 13:24

## 2020-02-07 RX ADMIN — IBUPROFEN 800 MG: 800 TABLET, FILM COATED ORAL at 05:38

## 2020-02-07 RX ADMIN — OXYCODONE HYDROCHLORIDE AND ACETAMINOPHEN 1 TABLET: 10; 325 TABLET ORAL at 05:38

## 2020-02-07 RX ADMIN — DOCUSATE SODIUM 100 MG: 100 CAPSULE, LIQUID FILLED ORAL at 13:24

## 2020-02-07 NOTE — LACTATION NOTE
This note was copied from a baby's chart.  Breast feeding going better. Baby remains on phototherapy and supplementing with formula.  Baby girl prefers right breast in football, suggested cross-cradle on left. D/c today. Encouraged pumping every 3 hrs if baby is not nursing well. Gave [facility] card. Mom reports oversupply with last baby.

## 2020-02-07 NOTE — DISCHARGE SUMMARY
[facility]  Vaginal Delivery Progress Note    Subjective   Postpartum Day 2 Vaginal Delivery.    The patient feels well without complaints. Ready for discharge. Planning to board since baby needs to stay.      Objective     Vital Signs Range for the last 24 hours  Temperature: Temp:  [97.5 °F (36.4 °C)-98.2 °F (36.8 °C)] 97.9 °F (36.6 °C)       BP: BP: ()/(61-79) 111/75   Pulse: Heart Rate:  [] 71   Respirations: Resp:  [16] 16                       Physical Exam:  General: Awake and alert  Abdomen: Fundus: firm, non tender, and below umbilicus  Extremities:  Calves NT bilaterally        Assessment/Plan     PPD2  S/P  - routine care. Discharge home. Instructions reviewed.   Rx sent/on chart.  Follow up in 6 weeks.     Post partum hemorrhage - responded to atony tx. Normal lochia now. Clinically stable. Ambulating/showered. Wants to be discharged. D/c hb 9.5      PROBLEM LIST    Normal labor        Marie Anderson MD  2020  1:49 PM

## 2020-02-08 LAB
ABO + RH BLD: NORMAL
ABO + RH BLD: NORMAL
BH BB BLOOD EXPIRATION DATE: NORMAL
BH BB BLOOD EXPIRATION DATE: NORMAL
BH BB BLOOD TYPE BARCODE: 5100
BH BB BLOOD TYPE BARCODE: 5100
BH BB DISPENSE STATUS: NORMAL
BH BB DISPENSE STATUS: NORMAL
BH BB PRODUCT CODE: NORMAL
BH BB PRODUCT CODE: NORMAL
BH BB UNIT NUMBER: NORMAL
BH BB UNIT NUMBER: NORMAL
CROSSMATCH INTERPRETATION: NORMAL
CROSSMATCH INTERPRETATION: NORMAL
UNIT  ABO: NORMAL
UNIT  ABO: NORMAL
UNIT  RH: NORMAL
UNIT  RH: NORMAL

## 2020-02-13 ENCOUNTER — TELEPHONE (OUTPATIENT)
Dept: LACTATION | Facility: HOSPITAL | Age: 30
End: 2020-02-13

## 2020-02-13 NOTE — TELEPHONE ENCOUNTER
Mom reports baby started with good latch in hospital but she had to supplement baby with formula due to jaundice. Baby is now preferring the bottle and doesn't want to latch. Educated mom on how to start nose to nipple to obtain deep latch. Kay. Made for 2/17/20

## 2020-02-17 ENCOUNTER — APPOINTMENT (OUTPATIENT)
Dept: LACTATION | Facility: HOSPITAL | Age: 30
End: 2020-02-17

## 2023-03-03 LAB
EXTERNAL HEPATITIS B SURFACE ANTIGEN: NEGATIVE
EXTERNAL HEPATITIS C AB: NEGATIVE
EXTERNAL RUBELLA QUALITATIVE: NORMAL
EXTERNAL SYPHILIS RPR SCREEN: NORMAL
HIV1 P24 AG SERPL QL IA: NORMAL

## 2023-05-02 LAB — EXTERNAL GROUP B STREP ANTIGEN: POSITIVE

## 2023-08-25 ENCOUNTER — HOSPITAL ENCOUNTER (EMERGENCY)
Facility: HOSPITAL | Age: 33
Discharge: HOME | End: 2023-08-25
Attending: OBSTETRICS & GYNECOLOGY | Admitting: OBSTETRICS & GYNECOLOGY
Payer: COMMERCIAL

## 2023-08-25 VITALS
RESPIRATION RATE: 16 BRPM | SYSTOLIC BLOOD PRESSURE: 106 MMHG | BODY MASS INDEX: 34.41 KG/M2 | DIASTOLIC BLOOD PRESSURE: 65 MMHG | WEIGHT: 187 LBS | HEIGHT: 62 IN | TEMPERATURE: 98.2 F | HEART RATE: 85 BPM

## 2023-08-25 LAB
BACTERIA UR QL AUTO: ABNORMAL /HPF
BILIRUB UR QL STRIP: NEGATIVE
CLARITY UR: ABNORMAL
COLOR UR: YELLOW
GLUCOSE UR STRIP-MCNC: NEGATIVE MG/DL
HGB UR QL STRIP.AUTO: NEGATIVE
HYALINE CASTS UR QL AUTO: ABNORMAL /LPF
KETONES UR QL STRIP: NEGATIVE
LEUKOCYTE ESTERASE UR QL STRIP.AUTO: ABNORMAL
NITRITE UR QL STRIP: NEGATIVE
PH UR STRIP.AUTO: 7.5 [PH] (ref 5–8)
PROT UR QL STRIP: NEGATIVE
RBC # UR STRIP: ABNORMAL /HPF
REF LAB TEST METHOD: ABNORMAL
SP GR UR STRIP: 1.01 (ref 1–1.03)
SQUAMOUS #/AREA URNS HPF: ABNORMAL /HPF
UROBILINOGEN UR QL STRIP: ABNORMAL
WBC # UR STRIP: ABNORMAL /HPF

## 2023-08-25 PROCEDURE — 99285 EMERGENCY DEPT VISIT HI MDM: CPT | Performed by: OBSTETRICS & GYNECOLOGY

## 2023-08-25 PROCEDURE — 81001 URINALYSIS AUTO W/SCOPE: CPT | Performed by: OBSTETRICS & GYNECOLOGY

## 2023-08-25 PROCEDURE — 59025 FETAL NON-STRESS TEST: CPT

## 2023-08-25 PROCEDURE — 87086 URINE CULTURE/COLONY COUNT: CPT | Performed by: OBSTETRICS & GYNECOLOGY

## 2023-08-25 RX ORDER — SERTRALINE HYDROCHLORIDE 25 MG/1
25 TABLET, FILM COATED ORAL DAILY
COMMUNITY
Start: 2023-05-18

## 2023-08-25 NOTE — OBED NOTES
[facility] Note OB        Patient Name: Claudia Sierra  YOB: 1990  MRN: [id number]  Admission Date: 2023  6:43 AM  Date of Service: 2023    Chief Complaint: Contractions (Pt to [facility] with c/o contractions that started around 0430 and are every 5-10 minutes.  Pt denies vaginal bleeding, loss of fluid, and recent intercourse.)        Subjective     Claudia Sierra is a 33 y.o. female  at 33w0d with Estimated Date of Delivery: 10/13/23 who presents with the chief complaint listed above.  She sees Che Mosher MD for her prenatal care. Her pregnancy has been complicated by:   uncomplicated .        She describes fetal movement as normal.  She denies rupture of membranes.  She denies vaginal bleeding. She is feeling contractions.          Objective   Patient Active Problem List    Diagnosis     Postpartum hemorrhage [O72.1]     Normal labor [O80, Z37.9]         OB History    Para Term  AB Living   3 2 2 0 0 2   SAB IAB Ectopic Molar Multiple Live Births   0 0 0 0 0 2      # Outcome Date GA Lbr Jozef/2nd Weight Sex Delivery Anes PTL Lv   3 Current            2 Term 20 39w2d 05:24 / 00:11 3676 g (8 lb 1.7 oz) F Vag-Spont EPI N ANIYAH      Birth Comments: scale 4      Name: OZ SIERRA      Apgar1: 9  Apgar5: 9   1 Term 10/26/16    M Vag-Spont EPI  ANIYAH        Past Medical History:   Diagnosis Date    Gestational hypertension     with first pregnancy    History of bacterial meningitis     2016 following last delivery    History of transfusion     2016    Migraine     HAs outside of pregnancy; takes OTC meds    Seizures     Pt reports grand mal seizure d/t brain bleed PP following last delivery; pt had bacterial meningitis/endometritis       Past Surgical History:   Procedure Laterality Date    CHOLECYSTECTOMY  2017    WISDOM TOOTH EXTRACTION         No current facility-administered medications on file prior to encounter.     Current Outpatient Medications on File  "Prior to Encounter   Medication Sig Dispense Refill    doxylamine (UNISOM) 25 MG tablet Take 1 tablet by mouth At Night As Needed for Sleep.      Prenatal Vit-Fe Fumarate-FA (PRENATAL, CLASSIC, VITAMIN) 28-0.8 MG tablet tablet Take 1 tablet by mouth Daily.      sertraline (ZOLOFT) 25 MG tablet Take 1 tablet by mouth Daily.      Docosahexaenoic Acid (DHA COMPLETE PO) Take  by mouth.      ibuprofen (ADVIL,MOTRIN) 800 MG tablet Take 1 tablet by mouth Every 8 (Eight) Hours As Needed for Mild Pain . 30 tablet 0       Allergies   Allergen Reactions    Amoxicillin Hives    Neomycin Unknown - Low Severity    Dilantin [Phenytoin Sodium Extended] Hives       Family History   Problem Relation Age of Onset    Arrhythmia Maternal Grandmother        Social History     Socioeconomic History    Marital status:    Tobacco Use    Smoking status: Never    Smokeless tobacco: Never   Vaping Use    Vaping Use: Never used   Substance and Sexual Activity    Alcohol use: Never    Drug use: Never    Sexual activity: Yes     Partners: Male           Review of Systems   Constitutional:  Negative for chills, fatigue and fever.   HENT:  Negative for congestion, rhinorrhea and sore throat.    Eyes:  Negative for visual disturbance.   Respiratory: Negative.     Cardiovascular: Negative.    Gastrointestinal:  Positive for abdominal pain. Negative for constipation, diarrhea, nausea and vomiting.   Genitourinary:  Negative for difficulty urinating, dyspareunia, dysuria, flank pain, frequency, genital sores, hematuria, pelvic pain, urgency, vaginal bleeding, vaginal discharge and vaginal pain.   Neurological:  Negative for dizziness, seizures, light-headedness and headaches.   Psychiatric/Behavioral:  Negative for sleep disturbance. The patient is not nervous/anxious.         PHYSICAL EXAM:      VITAL SIGNS:  Vitals:    08/25/23 0717   Resp: 16   Temp: 98.2 øF (36.8 øC)   TempSrc: Oral   Weight: 84.8 kg (187 lb)   Height: 157.5 cm (62\")    "     FHT'S:                   Baseline:  130 BPM  Variability:  Moderate = 6 - 25 BPM  Accelerations:  15 x 15 accelerations present     Decelerations:  absent  Contractions:   absent     Interpretation:    Reactive NST, CAT 1 tracing        PHYSICAL EXAM:    General: well developed; well nourished  no acute distress   Heart: Not performed.   Lungs  : breathing is unlabored     Abdomen: soft, non-tender; no masses  no umbilical or inguinal hernias are present  no hepato-splenomegaly       Cervix: was checked (by RN): 0 cm / 1 % / -3  Cervical Dilation (cm): 0  Cervical Effacement: 0%  Fetal Station: -3  Cervical Consistency: soft  Cervical Position: mid-position   Contractions: none        Extremities: peripheral pulses normal, no pedal edema, no clubbing or cyanosis      LABS AND TESTING ORDERED:  Uterine and fetal monitoring  Urinalysis      LAB RESULTS:    Recent Results (from the past 24 hour(s))   Urinalysis With Culture If Indicated - Urine, Clean Catch    Collection Time: 23  7:28 AM    Specimen: Urine, Clean Catch   Result Value Ref Range    Color, UA Yellow Yellow, Straw    Appearance, UA Hazy (A) Clear    pH, UA 7.5 5.0 - 8.0    Specific Gravity, UA 1.010 1.005 - 1.030    Glucose, UA Negative Negative    Ketones, UA Negative Negative    Bilirubin, UA Negative Negative    Blood, UA Negative Negative    Protein, UA Negative Negative    Leuk Esterase, UA Trace (A) Negative    Nitrite, UA Negative Negative    Urobilinogen, UA 0.2 E.U./dL 0.2 - 1.0 E.U./dL       Lab Results   Component Value Date    ABO O 2020    RH Positive 2020       Lab Results   Component Value Date    STREPGPB POS 01/15/2020                 Assessment & Plan     ASSESSMENT/PLAN:  Claudia Sierra is a 33 y.o. female  at 33w0d who presented with: contractions.  Patient's cervix was closed on initial presentation and there were no measurable contractions on monitoring.  She was kept for observation and hydrated.   "After two hours, she was rechecked and found to be unchanged.  She was discharged home and given return precautions for worsening symptoms.            Final Impression:  Pregnancy at 33w0d  Reactive NST.  CAT 1 tracing  False  labor  Maternal vital signs were reviewed and were unremarkable              Vitals:    23 0717   Resp: 16   Temp: 98.2 øF (36.8 øC)   TempSrc: Oral   Weight: 84.8 kg (187 lb)   Height: 157.5 cm (62\")       Lab Results   Component Value Date    STREPGPB POS 01/15/2020     Lab Results   Component Value Date    ABO O 2020    RH Positive 2020     COVID - 19 status unknown      PLAN:       I have spent 45 minutes including face to face time with the patient, greater than 50% in discussion of the diagnosis (counseling) and/or coordination of care.     Tania Mar MD  2023  07:54 EDT  OB Hospitalist  Phone:  [phone]  "

## 2023-08-26 LAB — BACTERIA SPEC AEROBE CULT: NORMAL

## 2023-10-11 ENCOUNTER — HOSPITAL ENCOUNTER (INPATIENT)
Facility: HOSPITAL | Age: 33
LOS: 2 days | Discharge: HOME | End: 2023-10-13
Attending: OBSTETRICS & GYNECOLOGY | Admitting: OBSTETRICS & GYNECOLOGY
Payer: COMMERCIAL

## 2023-10-11 ENCOUNTER — ANESTHESIA EVENT (OUTPATIENT)
Dept: LABOR AND DELIVERY | Facility: HOSPITAL | Age: 33
End: 2023-10-11
Payer: COMMERCIAL

## 2023-10-11 ENCOUNTER — ANESTHESIA (OUTPATIENT)
Dept: LABOR AND DELIVERY | Facility: HOSPITAL | Age: 33
End: 2023-10-11
Payer: COMMERCIAL

## 2023-10-11 PROBLEM — Z34.90 PREGNANCY: Status: ACTIVE | Noted: 2023-10-11

## 2023-10-11 LAB
ABO GROUP BLD: NORMAL
ALBUMIN SERPL-MCNC: 3.8 G/DL (ref 3.5–5.2)
ALBUMIN/GLOB SERPL: 1.4 G/DL
ALP SERPL-CCNC: 144 U/L (ref 39–117)
ALT SERPL W P-5'-P-CCNC: 33 U/L (ref 1–33)
ANION GAP SERPL CALCULATED.3IONS-SCNC: 17.8 MMOL/L (ref 5–15)
AST SERPL-CCNC: 21 U/L (ref 1–32)
ATMOSPHERIC PRESS: 744.1 MMHG
BASE EXCESS BLDCOA CALC-SCNC: -4.8 MMOL/L (ref -2–2)
BASOPHILS # BLD AUTO: 0.03 10*3/MM3 (ref 0–0.2)
BASOPHILS NFR BLD AUTO: 0.4 % (ref 0–1.5)
BDY SITE: ABNORMAL
BILIRUB SERPL-MCNC: 0.2 MG/DL (ref 0–1.2)
BLD GP AB SCN SERPL QL: NEGATIVE
BUN SERPL-MCNC: 6 MG/DL (ref 6–20)
BUN/CREAT SERPL: 10.9 (ref 7–25)
CALCIUM SPEC-SCNC: 9.3 MG/DL (ref 8.6–10.5)
CHLORIDE SERPL-SCNC: 101 MMOL/L (ref 98–107)
CO2 BLDA-SCNC: 26.7 MMOL/L (ref 23–27)
CO2 SERPL-SCNC: 20.2 MMOL/L (ref 22–29)
CREAT SERPL-MCNC: 0.55 MG/DL (ref 0.57–1)
DEPRECATED RDW RBC AUTO: 46 FL (ref 37–54)
DEVICE COMMENT: ABNORMAL
EGFRCR SERPLBLD CKD-EPI 2021: 123.5 ML/MIN/1.73
EOSINOPHIL # BLD AUTO: 0.02 10*3/MM3 (ref 0–0.4)
EOSINOPHIL NFR BLD AUTO: 0.2 % (ref 0.3–6.2)
ERYTHROCYTE [DISTWIDTH] IN BLOOD BY AUTOMATED COUNT: 15.2 % (ref 12.3–15.4)
GLOBULIN UR ELPH-MCNC: 2.7 GM/DL
GLUCOSE SERPL-MCNC: 131 MG/DL (ref 65–99)
HCO3 BLDCOA-SCNC: 24.8 MMOL/L (ref 22–28)
HCT VFR BLD AUTO: 34.5 % (ref 34–46.6)
HGB BLD-MCNC: 11.8 G/DL (ref 12–15.9)
IMM GRANULOCYTES # BLD AUTO: 0.31 10*3/MM3 (ref 0–0.05)
IMM GRANULOCYTES NFR BLD AUTO: 3.7 % (ref 0–0.5)
LYMPHOCYTES # BLD AUTO: 1.58 10*3/MM3 (ref 0.7–3.1)
LYMPHOCYTES NFR BLD AUTO: 18.9 % (ref 19.6–45.3)
MCH RBC QN AUTO: 28.9 PG (ref 26.6–33)
MCHC RBC AUTO-ENTMCNC: 34.2 G/DL (ref 31.5–35.7)
MCV RBC AUTO: 84.4 FL (ref 79–97)
MODALITY: ABNORMAL
MONOCYTES # BLD AUTO: 0.46 10*3/MM3 (ref 0.1–0.9)
MONOCYTES NFR BLD AUTO: 5.5 % (ref 5–12)
NEUTROPHILS NFR BLD AUTO: 5.98 10*3/MM3 (ref 1.7–7)
NEUTROPHILS NFR BLD AUTO: 71.3 % (ref 42.7–76)
NRBC BLD AUTO-RTO: 0 /100 WBC (ref 0–0.2)
PCO2 BLDCOA: 61.6 MMHG (ref 43–63)
PH BLDCOA: 7.21 PH UNITS (ref 7.18–7.34)
PLATELET # BLD AUTO: 237 10*3/MM3 (ref 140–450)
PMV BLD AUTO: 10 FL (ref 6–12)
PO2 BLDCOA: 20.5 MMHG (ref 12–26)
POTASSIUM SERPL-SCNC: 3.7 MMOL/L (ref 3.5–5.2)
PROT SERPL-MCNC: 6.5 G/DL (ref 6–8.5)
RBC # BLD AUTO: 4.09 10*6/MM3 (ref 3.77–5.28)
RH BLD: POSITIVE
SAO2 % BLDCOA: 23.4 %
SODIUM SERPL-SCNC: 139 MMOL/L (ref 136–145)
T&S EXPIRATION DATE: NORMAL
WBC NRBC COR # BLD: 8.38 10*3/MM3 (ref 3.4–10.8)

## 2023-10-11 PROCEDURE — 25010000002 CEFAZOLIN IN DEXTROSE 2-4 GM/100ML-% SOLUTION: Performed by: OBSTETRICS & GYNECOLOGY

## 2023-10-11 PROCEDURE — 85025 COMPLETE CBC W/AUTO DIFF WBC: CPT | Performed by: OBSTETRICS & GYNECOLOGY

## 2023-10-11 PROCEDURE — 3E033VJ INTRODUCTION OF OTHER HORMONE INTO PERIPHERAL VEIN, PERCUTANEOUS APPROACH: ICD-10-PCS | Performed by: OBSTETRICS & GYNECOLOGY

## 2023-10-11 PROCEDURE — 86900 BLOOD TYPING SEROLOGIC ABO: CPT | Performed by: OBSTETRICS & GYNECOLOGY

## 2023-10-11 PROCEDURE — 86901 BLOOD TYPING SEROLOGIC RH(D): CPT | Performed by: OBSTETRICS & GYNECOLOGY

## 2023-10-11 PROCEDURE — 80053 COMPREHEN METABOLIC PANEL: CPT | Performed by: OBSTETRICS & GYNECOLOGY

## 2023-10-11 PROCEDURE — 86850 RBC ANTIBODY SCREEN: CPT | Performed by: OBSTETRICS & GYNECOLOGY

## 2023-10-11 PROCEDURE — 82803 BLOOD GASES ANY COMBINATION: CPT

## 2023-10-11 PROCEDURE — 88307 TISSUE EXAM BY PATHOLOGIST: CPT

## 2023-10-11 PROCEDURE — 25810000003 LACTATED RINGERS PER 1000 ML: Performed by: OBSTETRICS & GYNECOLOGY

## 2023-10-11 PROCEDURE — C1755 CATHETER, INTRASPINAL: HCPCS | Performed by: ANESTHESIOLOGY

## 2023-10-11 PROCEDURE — 10907ZC DRAINAGE OF AMNIOTIC FLUID, THERAPEUTIC FROM PRODUCTS OF CONCEPTION, VIA NATURAL OR ARTIFICIAL OPENING: ICD-10-PCS | Performed by: OBSTETRICS & GYNECOLOGY

## 2023-10-11 RX ORDER — MISOPROSTOL 200 UG/1
600 TABLET ORAL ONCE AS NEEDED
Status: DISCONTINUED | OUTPATIENT
Start: 2023-10-11 | End: 2023-10-13 | Stop reason: HOSPADM

## 2023-10-11 RX ORDER — ONDANSETRON 2 MG/ML
4 INJECTION INTRAMUSCULAR; INTRAVENOUS ONCE AS NEEDED
Status: DISCONTINUED | OUTPATIENT
Start: 2023-10-11 | End: 2023-10-11 | Stop reason: HOSPADM

## 2023-10-11 RX ORDER — FAMOTIDINE 10 MG/ML
20 INJECTION, SOLUTION INTRAVENOUS ONCE AS NEEDED
Status: DISCONTINUED | OUTPATIENT
Start: 2023-10-11 | End: 2023-10-11 | Stop reason: HOSPADM

## 2023-10-11 RX ORDER — SODIUM CHLORIDE, SODIUM LACTATE, POTASSIUM CHLORIDE, CALCIUM CHLORIDE 600; 310; 30; 20 MG/100ML; MG/100ML; MG/100ML; MG/100ML
125 INJECTION, SOLUTION INTRAVENOUS CONTINUOUS
Status: DISCONTINUED | OUTPATIENT
Start: 2023-10-11 | End: 2023-10-11

## 2023-10-11 RX ORDER — HYDROCORTISONE 25 MG/G
CREAM TOPICAL AS NEEDED
Status: DISCONTINUED | OUTPATIENT
Start: 2023-10-11 | End: 2023-10-13 | Stop reason: HOSPADM

## 2023-10-11 RX ORDER — PROMETHAZINE HYDROCHLORIDE 25 MG/1
25 TABLET ORAL EVERY 6 HOURS PRN
Status: DISCONTINUED | OUTPATIENT
Start: 2023-10-11 | End: 2023-10-13 | Stop reason: HOSPADM

## 2023-10-11 RX ORDER — TERBUTALINE SULFATE 1 MG/ML
0.25 INJECTION, SOLUTION SUBCUTANEOUS AS NEEDED
Status: DISCONTINUED | OUTPATIENT
Start: 2023-10-11 | End: 2023-10-11 | Stop reason: HOSPADM

## 2023-10-11 RX ORDER — BISACODYL 10 MG
10 SUPPOSITORY, RECTAL RECTAL DAILY PRN
Status: DISCONTINUED | OUTPATIENT
Start: 2023-10-12 | End: 2023-10-13 | Stop reason: HOSPADM

## 2023-10-11 RX ORDER — TRAMADOL HYDROCHLORIDE 50 MG/1
50 TABLET ORAL EVERY 6 HOURS PRN
Status: DISCONTINUED | OUTPATIENT
Start: 2023-10-11 | End: 2023-10-13 | Stop reason: HOSPADM

## 2023-10-11 RX ORDER — IBUPROFEN 600 MG/1
600 TABLET ORAL EVERY 6 HOURS PRN
Status: DISCONTINUED | OUTPATIENT
Start: 2023-10-11 | End: 2023-10-13 | Stop reason: HOSPADM

## 2023-10-11 RX ORDER — METHYLERGONOVINE MALEATE 0.2 MG/ML
200 INJECTION INTRAVENOUS ONCE AS NEEDED
Status: DISCONTINUED | OUTPATIENT
Start: 2023-10-11 | End: 2023-10-11 | Stop reason: HOSPADM

## 2023-10-11 RX ORDER — PHYTONADIONE 1 MG/.5ML
INJECTION, EMULSION INTRAMUSCULAR; INTRAVENOUS; SUBCUTANEOUS
Status: ACTIVE
Start: 2023-10-11 | End: 2023-10-12

## 2023-10-11 RX ORDER — ACETAMINOPHEN 325 MG/1
650 TABLET ORAL EVERY 6 HOURS PRN
Status: DISCONTINUED | OUTPATIENT
Start: 2023-10-11 | End: 2023-10-13 | Stop reason: HOSPADM

## 2023-10-11 RX ORDER — HYDROXYZINE 50 MG/1
50 TABLET, FILM COATED ORAL NIGHTLY PRN
Status: DISCONTINUED | OUTPATIENT
Start: 2023-10-11 | End: 2023-10-13 | Stop reason: HOSPADM

## 2023-10-11 RX ORDER — METHYLERGONOVINE MALEATE 0.2 MG/ML
200 INJECTION INTRAVENOUS ONCE AS NEEDED
Status: DISCONTINUED | OUTPATIENT
Start: 2023-10-11 | End: 2023-10-13 | Stop reason: HOSPADM

## 2023-10-11 RX ORDER — ACETAMINOPHEN 325 MG/1
650 TABLET ORAL EVERY 4 HOURS PRN
Status: DISCONTINUED | OUTPATIENT
Start: 2023-10-11 | End: 2023-10-11 | Stop reason: HOSPADM

## 2023-10-11 RX ORDER — MAGNESIUM CARB/ALUMINUM HYDROX 105-160MG
30 TABLET,CHEWABLE ORAL ONCE AS NEEDED
Status: DISCONTINUED | OUTPATIENT
Start: 2023-10-11 | End: 2023-10-11 | Stop reason: HOSPADM

## 2023-10-11 RX ORDER — ONDANSETRON 2 MG/ML
4 INJECTION INTRAMUSCULAR; INTRAVENOUS EVERY 6 HOURS PRN
Status: DISCONTINUED | OUTPATIENT
Start: 2023-10-11 | End: 2023-10-11 | Stop reason: HOSPADM

## 2023-10-11 RX ORDER — OXYTOCIN/0.9 % SODIUM CHLORIDE 30/500 ML
2-20 PLASTIC BAG, INJECTION (ML) INTRAVENOUS
Status: DISCONTINUED | OUTPATIENT
Start: 2023-10-11 | End: 2023-10-11 | Stop reason: HOSPADM

## 2023-10-11 RX ORDER — PROMETHAZINE HYDROCHLORIDE 12.5 MG/1
12.5 SUPPOSITORY RECTAL EVERY 6 HOURS PRN
Status: DISCONTINUED | OUTPATIENT
Start: 2023-10-11 | End: 2023-10-13 | Stop reason: HOSPADM

## 2023-10-11 RX ORDER — TRANEXAMIC ACID 10 MG/ML
1000 INJECTION, SOLUTION INTRAVENOUS ONCE AS NEEDED
Status: DISCONTINUED | OUTPATIENT
Start: 2023-10-11 | End: 2023-10-13 | Stop reason: HOSPADM

## 2023-10-11 RX ORDER — KETOROLAC TROMETHAMINE 15 MG/ML
15 INJECTION, SOLUTION INTRAMUSCULAR; INTRAVENOUS EVERY 6 HOURS PRN
Status: DISCONTINUED | OUTPATIENT
Start: 2023-10-11 | End: 2023-10-13 | Stop reason: HOSPADM

## 2023-10-11 RX ORDER — ERYTHROMYCIN 5 MG/G
OINTMENT OPHTHALMIC
Status: ACTIVE
Start: 2023-10-11 | End: 2023-10-12

## 2023-10-11 RX ORDER — OXYTOCIN/0.9 % SODIUM CHLORIDE 30/500 ML
250 PLASTIC BAG, INJECTION (ML) INTRAVENOUS CONTINUOUS
Status: DISPENSED | OUTPATIENT
Start: 2023-10-11 | End: 2023-10-11

## 2023-10-11 RX ORDER — SODIUM CHLORIDE 0.9 % (FLUSH) 0.9 %
10 SYRINGE (ML) INJECTION AS NEEDED
Status: DISCONTINUED | OUTPATIENT
Start: 2023-10-11 | End: 2023-10-11 | Stop reason: HOSPADM

## 2023-10-11 RX ORDER — CARBOPROST TROMETHAMINE 250 UG/ML
250 INJECTION, SOLUTION INTRAMUSCULAR
Status: DISCONTINUED | OUTPATIENT
Start: 2023-10-11 | End: 2023-10-11 | Stop reason: HOSPADM

## 2023-10-11 RX ORDER — DOCUSATE SODIUM 100 MG/1
100 CAPSULE, LIQUID FILLED ORAL 2 TIMES DAILY
Status: DISCONTINUED | OUTPATIENT
Start: 2023-10-11 | End: 2023-10-13 | Stop reason: HOSPADM

## 2023-10-11 RX ORDER — TRANEXAMIC ACID 10 MG/ML
1000 INJECTION, SOLUTION INTRAVENOUS ONCE AS NEEDED
Status: DISCONTINUED | OUTPATIENT
Start: 2023-10-11 | End: 2023-10-11

## 2023-10-11 RX ORDER — CEFAZOLIN SODIUM 2 G/100ML
2000 INJECTION, SOLUTION INTRAVENOUS EVERY 8 HOURS
Status: DISCONTINUED | OUTPATIENT
Start: 2023-10-11 | End: 2023-10-11

## 2023-10-11 RX ORDER — SERTRALINE HYDROCHLORIDE 25 MG/1
25 TABLET, FILM COATED ORAL DAILY
Status: DISCONTINUED | OUTPATIENT
Start: 2023-10-12 | End: 2023-10-13 | Stop reason: HOSPADM

## 2023-10-11 RX ORDER — CALCIUM CARBONATE 500 MG/1
2 TABLET, CHEWABLE ORAL 3 TIMES DAILY PRN
Status: DISCONTINUED | OUTPATIENT
Start: 2023-10-11 | End: 2023-10-13 | Stop reason: HOSPADM

## 2023-10-11 RX ORDER — MISOPROSTOL 200 UG/1
800 TABLET ORAL ONCE AS NEEDED
Status: DISCONTINUED | OUTPATIENT
Start: 2023-10-11 | End: 2023-10-11 | Stop reason: HOSPADM

## 2023-10-11 RX ORDER — SODIUM CHLORIDE 9 MG/ML
40 INJECTION, SOLUTION INTRAVENOUS AS NEEDED
Status: DISCONTINUED | OUTPATIENT
Start: 2023-10-11 | End: 2023-10-11 | Stop reason: HOSPADM

## 2023-10-11 RX ORDER — FAMOTIDINE 20 MG/1
20 TABLET, FILM COATED ORAL 2 TIMES DAILY PRN
Status: DISCONTINUED | OUTPATIENT
Start: 2023-10-11 | End: 2023-10-11 | Stop reason: HOSPADM

## 2023-10-11 RX ORDER — OXYTOCIN/0.9 % SODIUM CHLORIDE 30/500 ML
125 PLASTIC BAG, INJECTION (ML) INTRAVENOUS CONTINUOUS PRN
Status: COMPLETED | OUTPATIENT
Start: 2023-10-11 | End: 2023-10-11

## 2023-10-11 RX ORDER — ONDANSETRON 2 MG/ML
4 INJECTION INTRAMUSCULAR; INTRAVENOUS EVERY 6 HOURS PRN
Status: DISCONTINUED | OUTPATIENT
Start: 2023-10-11 | End: 2023-10-13 | Stop reason: HOSPADM

## 2023-10-11 RX ORDER — OXYTOCIN/0.9 % SODIUM CHLORIDE 30/500 ML
999 PLASTIC BAG, INJECTION (ML) INTRAVENOUS ONCE
Status: COMPLETED | OUTPATIENT
Start: 2023-10-11 | End: 2023-10-11

## 2023-10-11 RX ORDER — CITRIC ACID/SODIUM CITRATE 334-500MG
30 SOLUTION, ORAL ORAL ONCE
Status: COMPLETED | OUTPATIENT
Start: 2023-10-11 | End: 2023-10-11

## 2023-10-11 RX ORDER — EPHEDRINE SULFATE 50 MG/ML
5 INJECTION, SOLUTION INTRAVENOUS
Status: DISCONTINUED | OUTPATIENT
Start: 2023-10-11 | End: 2023-10-11 | Stop reason: HOSPADM

## 2023-10-11 RX ORDER — FENTANYL CIT 0.2 MG/100ML-ROPIV 0.2%-NACL 0.9% EPIDURAL INJ 2/0.2 MCG/ML-%
8 SOLUTION INJECTION CONTINUOUS
Status: DISCONTINUED | OUTPATIENT
Start: 2023-10-11 | End: 2023-10-11

## 2023-10-11 RX ORDER — FAMOTIDINE 10 MG/ML
20 INJECTION, SOLUTION INTRAVENOUS 2 TIMES DAILY PRN
Status: DISCONTINUED | OUTPATIENT
Start: 2023-10-11 | End: 2023-10-11 | Stop reason: HOSPADM

## 2023-10-11 RX ORDER — ONDANSETRON 4 MG/1
4 TABLET, FILM COATED ORAL EVERY 8 HOURS PRN
Status: DISCONTINUED | OUTPATIENT
Start: 2023-10-11 | End: 2023-10-13 | Stop reason: HOSPADM

## 2023-10-11 RX ORDER — ONDANSETRON 4 MG/1
4 TABLET, FILM COATED ORAL EVERY 6 HOURS PRN
Status: DISCONTINUED | OUTPATIENT
Start: 2023-10-11 | End: 2023-10-11 | Stop reason: HOSPADM

## 2023-10-11 RX ADMIN — LIDOCAINE HYDROCHLORIDE 3 ML: 10; .005 INJECTION, SOLUTION EPIDURAL; INFILTRATION; INTRACAUDAL; PERINEURAL at 14:03

## 2023-10-11 RX ADMIN — CEFAZOLIN SODIUM 2000 MG: 2 INJECTION, SOLUTION INTRAVENOUS at 08:43

## 2023-10-11 RX ADMIN — Medication: at 21:19

## 2023-10-11 RX ADMIN — Medication 10 ML/HR: at 14:07

## 2023-10-11 RX ADMIN — HYDROXYZINE HYDROCHLORIDE 50 MG: 50 TABLET ORAL at 21:28

## 2023-10-11 RX ADMIN — Medication 125 ML/HR: at 18:21

## 2023-10-11 RX ADMIN — Medication 999 ML/HR: at 17:07

## 2023-10-11 RX ADMIN — CEFAZOLIN SODIUM 2000 MG: 2 INJECTION, SOLUTION INTRAVENOUS at 16:15

## 2023-10-11 RX ADMIN — SODIUM CITRATE AND CITRIC ACID MONOHYDRATE 30 ML: 500; 334 SOLUTION ORAL at 13:45

## 2023-10-11 RX ADMIN — SODIUM CHLORIDE, POTASSIUM CHLORIDE, SODIUM LACTATE AND CALCIUM CHLORIDE 125 ML/HR: 600; 310; 30; 20 INJECTION, SOLUTION INTRAVENOUS at 07:50

## 2023-10-11 RX ADMIN — IBUPROFEN 600 MG: 600 TABLET, FILM COATED ORAL at 22:35

## 2023-10-11 RX ADMIN — Medication 2 MILLI-UNITS/MIN: at 08:44

## 2023-10-11 RX ADMIN — DOCUSATE SODIUM 100 MG: 100 CAPSULE, LIQUID FILLED ORAL at 21:19

## 2023-10-11 RX ADMIN — SODIUM CHLORIDE, POTASSIUM CHLORIDE, SODIUM LACTATE AND CALCIUM CHLORIDE 999 ML/HR: 600; 310; 30; 20 INJECTION, SOLUTION INTRAVENOUS at 13:44

## 2023-10-11 NOTE — ANESTHESIA PROCEDURE NOTES
Labor Epidural      Patient reassessed immediately prior to procedure    Patient location during procedure: OB  Start Time: 10/11/2023 1:52 PM  Stop Time: 10/11/2023 2:03 PM  Indication:at surgeon's request  Performed By  Anesthesiologist: Rommel Lopez MD  Preanesthetic Checklist  Completed: patient identified, IV checked, site marked, risks and benefits discussed, surgical consent, monitors and equipment checked, pre-op evaluation and timeout performed  Prep:  Pt Position:sitting  Sterile Tech:cap, mask, sterile barrier and gloves  Prep:chlorhexidine gluconate and isopropyl alcohol  Monitoring:blood pressure monitoring, continuous pulse oximetry and EKG  Epidural Block Procedure:  Approach:midline  Guidance:landmark technique and palpation technique  Location:L3-L4  Needle Type:Tuohy  Needle Gauge:17 G  Loss of Resistance Medium: saline  Loss of Resistance: 6cm  Cath Depth at skin:11 cm  Paresthesia: none  Aspiration:negative  Test Dose:negative  Number of Attempts: 1  Post Assessment:  Dressing:secured with tape and occlusive dressing applied  Pt Tolerance:patient tolerated the procedure well with no apparent complications  Complications:no

## 2023-10-11 NOTE — H&P
.[facility]  Obstetric History and Physical    Chief Complaint   Patient presents with    Scheduled Induction     Elective induction         Subjective     Patient is a 33 y.o. female  currently at 39w5d risk reducing pit induction. ,      PROBLEM LIST    Pregnancy      Past OB History:       OB History    Para Term  AB Living   3 2 2 0 0 2   SAB IAB Ectopic Molar Multiple Live Births   0 0 0 0 0 2      # Outcome Date GA Lbr Jozef/2nd Weight Sex Delivery Anes PTL Lv   3 Current            2 Term 20 39w2d 05:24 / 00:11 3676 g (8 lb 1.7 oz) F Vag-Spont EPI N ANIYAH      Birth Comments: scale 4      Name: OZ HUTCHINSON      Apgar1: 9  Apgar5: 9   1 Term 10/26/16    M Vag-Spont EPI  ANIYAH       Past Medical History: Past Medical History:   Diagnosis Date    Gestational hypertension     with first pregnancy    History of bacterial meningitis     2016 following last delivery    History of transfusion         Migraine     HAs outside of pregnancy; takes OTC meds    Seizures     Pt reports grand mal seizure d/t brain bleed PP following last delivery; pt had bacterial meningitis/endometritis      Past Surgical History Past Surgical History:   Procedure Laterality Date    CHOLECYSTECTOMY  2017    WISDOM TOOTH EXTRACTION        Family History: Family History   Problem Relation Age of Onset    Arrhythmia Maternal Grandmother       Social History:  reports that she has never smoked. She has never used smokeless tobacco.   reports no history of alcohol use.   reports no history of drug use.    Allergies:     Amoxicillin, Neomycin, and Dilantin [phenytoin sodium extended]       Objective       Vital Signs Range for the last 24 hours  Temperature: Temp:  [98.2 øF (36.8 øC)-98.6 øF (37 øC)] 98.2 øF (36.8 øC)   Temp Source: Temp src: Oral   BP: BP: (120-125)/(75-81) 125/81   Pulse: Heart Rate:  [82-98] 82   Respirations: Resp:  [16] 16                   Physical Examination:     General :  Alert in  NAD  Abdomen: Gravid, nontender        Cervix: Exam by: Method: sterile exam per physician   Dilation: Cervical Dilation (cm): 3   Effacement: Cervical Effacement: 50%   Station: Fetal Station: -2       Fetal Heart Rate Assessment   Method: Fetal HR Assessment Method: external   Beats/min: Fetal HR (beats/min): 130   Baseline: Fetal HR Baseline: normal range   Varibility: Fetal HR Variability: moderate (amplitude range 6 to 25 bpm)   Accels: Fetal HR Accelerations: greater than/equal to 15 bpm, lasting at least 15 seconds   Decels: Fetal HR Decelerations: absent   Tracing Category:       Uterine Assessment   Method: Method: external tocotransducer   Frequency (min): Contraction Frequency (Minutes): 3-5   Ctx Count in 10 min:     Duration:     Intensity: Contraction Intensity: mild by palpation   Intensity by IUPC:     Resting Tone: Uterine Resting Tone: soft by palpation   Resting Tone by IUPC:     Breese Units:         Cvx- 3/50/-2. AROM- clear..... vscan confirmed cephalic      Assessment & Plan       Assessment:  1.  Intrauterine pregnancy at 39w5d weeks gestation with reassuring fetal status.    2.  Pit induction    Plan:   Plan of care has been reviewed with patient and family,.   All questions answered.          Office prenatal reviewed        Marie Anderson MD  10/11/2023  13:27 EDT

## 2023-10-11 NOTE — ANESTHESIA POSTPROCEDURE EVALUATION
Patient: Claudia Sierra    Procedure Summary       Date: 10/11/23 Room / Location:     Anesthesia Start: 1352 Anesthesia Stop: 1705    Procedure: LABOR ANALGESIA Diagnosis:     Scheduled Providers:  Provider: Thang Groves MD    Anesthesia Type: epidural ASA Status: 3            Anesthesia Type: epidural    Vitals  Vitals Value Taken Time   /68 10/11/23 1730   Temp 37 øC (98.6 øF) 10/11/23 1715   Pulse 98 10/11/23 1730   Resp 16 10/11/23 1715   SpO2 100 % 10/11/23 1710   Vitals shown include unfiled device data.        Post Anesthesia Care and Evaluation      Comments: delivered

## 2023-10-11 NOTE — PLAN OF CARE
Problem: Adult Inpatient Plan of Care  Goal: Plan of Care Review  10/11/2023 1756 by Jennifer Ramirez RN  Outcome: Ongoing, Progressing  10/11/2023 1754 by Jennifer Ramirez RN  Outcome: Ongoing, Progressing  Flowsheets (Taken 10/11/2023 1754)  Progress: improving  Plan of Care Reviewed With: patient  Outcome Evaluation: Pt delivered vaginaly at 1705 on 10/11/2023. QBL was 14 mL. Pt doing well and will be transfered to postpartum  Goal: Absence of Hospital-Acquired Illness or Injury  10/11/2023 1756 by Jennifer Ramirez RN  Outcome: Ongoing, Progressing  10/11/2023 1754 by Jennifer Ramirez RN  Outcome: Ongoing, Progressing  Intervention: Identify and Manage Fall Risk  Recent Flowsheet Documentation  Taken 10/11/2023 1600 by Jennifer Ramirez RN  Safety Promotion/Fall Prevention: safety round/check completed  Taken 10/11/2023 1400 by Jennifer Ramirez RN  Safety Promotion/Fall Prevention: safety round/check completed  Taken 10/11/2023 1200 by Jennifer Ramirez RN  Safety Promotion/Fall Prevention: safety round/check completed  Taken 10/11/2023 1000 by Jennifer Ramirez RN  Safety Promotion/Fall Prevention: safety round/check completed  Taken 10/11/2023 0800 by Jennifer Ramirez RN  Safety Promotion/Fall Prevention: safety round/check completed  Intervention: Prevent Skin Injury  Recent Flowsheet Documentation  Taken 10/11/2023 1715 by Jennifer Ramirez RN  Body Position: sitting up in bed  Taken 10/11/2023 0800 by Jennifer Ramirez RN  Body Position: sitting up in bed  Intervention: Prevent and Manage VTE (Venous Thromboembolism) Risk  Recent Flowsheet Documentation  Taken 10/11/2023 1715 by Jennifer Ramirez RN  Activity Management: bedrest  Taken 10/11/2023 0800 by Jeninfer Ramirez RN  Activity Management: up ad shalini  VTE Prevention/Management: sequential compression devices off  Range of Motion: active ROM (range of motion) encouraged  Goal: Optimal Comfort and Wellbeing  10/11/2023 1756 by Jennifer Ramirez RN  Outcome: Ongoing,  Progressing  10/11/2023 1754 by Jennifer Ramirez RN  Outcome: Ongoing, Progressing  Goal: Readiness for Transition of Care  10/11/2023 1756 by Jennifer Ramirez RN  Outcome: Ongoing, Progressing  10/11/2023 1754 by Jennifer Ramirez RN  Outcome: Ongoing, Progressing  Intervention: Mutually Develop Transition Plan  Recent Flowsheet Documentation  Taken 10/11/2023 0850 by Jennifer Ramirez RN  Transportation Anticipated: car, drives self  Transportation Concerns: none  Patient/Family Anticipated Services at Transition: none  Patient/Family Anticipates Transition to: home  Taken 10/11/2023 0815 by Jennifer Ramirez RN  Equipment Currently Used at Home: none  Taken 10/11/2023 0811 by Jennifer Ramirez RN  Equipment Needed After Discharge: none  Equipment Currently Used at Home: none  Anticipated Changes Related to Illness: none  Transportation Anticipated: family or friend will provide  Transportation Concerns: none  Concerns to be Addressed: no discharge needs identified  Readmission Within the Last 30 Days: no previous admission in last 30 days  Patient/Family Anticipated Services at Transition: none   Goal Outcome Evaluation:  Plan of Care Reviewed With: patient        Progress: improving  Outcome Evaluation: Pt delivered vaginaly at 1705 on 10/11/2023. QBL was 14 mL. Pt doing well and will be transfered to postpartum

## 2023-10-11 NOTE — ANESTHESIA PREPROCEDURE EVALUATION
Anesthesia Evaluation     Patient summary reviewed and Nursing notes reviewed                Airway   Mallampati: II  TM distance: >3 FB  Neck ROM: full  No difficulty expected  Dental - normal exam     Pulmonary - negative pulmonary ROS and normal exam   Cardiovascular - normal exam  Exercise tolerance: good (4-7 METS)    (+) hypertension      Neuro/Psych  (+) seizures, headaches  GI/Hepatic/Renal/Endo - negative ROS     Musculoskeletal (-) negative ROS    Abdominal    Substance History - negative use     OB/GYN    (+) Pregnant, pregnancy induced hypertension        Other                    Anesthesia Plan    ASA 3     epidural     (39w5d  )    Anesthetic plan, risks, benefits, and alternatives have been provided, discussed and informed consent has been obtained with: patient.    CODE STATUS:    Level Of Support Discussed With: Patient  Code Status (Patient has no pulse and is not breathing): CPR (Attempt to Resuscitate)  Medical Interventions (Patient has pulse or is breathing): Full Support

## 2023-10-11 NOTE — L&D DELIVERY NOTE
[facility]  Vaginal Delivery Note    Delivery    Claudia Sierra 33 y.o.  at 39w5d    Induction: Oxytocin   Induction indication: risk reducing    Labor onset:10/11/2023  8:44 AM   Dilation complete: 10/11/2023  4:45 PM   Beginning of second stage: 10/11/2023  5:00 PM     Antibiotics received during labor: Yes            Delivery: Vaginal, Spontaneous     YOB: 2024    Time of Birth: 5:05 PM      Anesthesia: Epidural     Delivering clinician: Marie Anderson MD       Infant    Findings: Viable female  infant    Infant observations: Weight: 3245 g (7 lb 2.5 oz)    Observations/Comments:  ldr 4 panda      Apgars: 8  @ 1 minute /    9  @ 5 minutes     Placenta, Cord, and Fluid    Placenta delivered  Spontaneous   at  10/11/2023  5:08 PM     Cord: 3 vessels  present.   Cord blood obtained: Yes    Cord gases obtained:  Yes      Repair    Episiotomy: none   Lacerations: None to repair   Estimated Blood Loss: QBL  14mls.       Delivery narrative: The patient is a 33 y.o.  at 39w5d.  Pit induction. Membrane rupture/fluid: artificial rupture of membranes  at   on 10/11/2023  Normal  She progressed quickly in active labor. Had hand presentation from 4-8 cm. Was able to finally keep reduced.  FHR were overall reassuring without persistent worrisome decelerations.  SheProgressed to complete at 4:45 PM . Labored down until 10/11/2023  5:00 PM . She pushed a couple of contracctions minutes and had a   of a  3245 g (7 lb 2.5 oz)  female   infant   8  @ 1 minute /   9  @ 5 minutes. The right shoulder was the anterior shoulder and easily delivered. Arterial was pH sent. Nuchal x1- reduced   Placenta was spontaneously delivered,3 vessel cord, intact. . Cervix and rectum intact. There was no  laceration repaired in usual fashion with vicryl suture. QBL was 14mls. There were no complications. Mother and baby doing well at time of dictation.    Baby's left hand was the presenting hand. A little bruised  but moving and gripping well.      Pregnancy      Marie Anderson MD  10/15/23  23:10 EDT    Delivery note completed now- 5:20 PM EDT  too soon after delivery that nursing info not yet entered. Refreshing later so missing delivery demographics recorded.

## 2023-10-11 NOTE — PROGRESS NOTES
Comfortable with epidural.    Cvx- 5-6/80/-2.... hand presenting thru cervix.    Tried to get hand and can push back into uterus by head but then baby put it back down again.    Not tight compression. Moving hand/ fingers.    Fht- cat 1      Counseled compound presentation. D/w hand/ arm can be very bruised from compression. D/w can cause trauma to the arm/hand.  D/w sometimes needs csection. She wants to do everything possible to avoid a csection. Made good cervical change and this is 3rd delivery.    Since baby doing fine now, will give a little more time to see if baby will retract arm up.

## 2023-10-12 LAB
BASOPHILS # BLD AUTO: 0.02 10*3/MM3 (ref 0–0.2)
BASOPHILS NFR BLD AUTO: 0.2 % (ref 0–1.5)
DEPRECATED RDW RBC AUTO: 48.2 FL (ref 37–54)
EOSINOPHIL # BLD AUTO: 0.05 10*3/MM3 (ref 0–0.4)
EOSINOPHIL NFR BLD AUTO: 0.5 % (ref 0.3–6.2)
ERYTHROCYTE [DISTWIDTH] IN BLOOD BY AUTOMATED COUNT: 15.7 % (ref 12.3–15.4)
HCT VFR BLD AUTO: 31.4 % (ref 34–46.6)
HGB BLD-MCNC: 10.6 G/DL (ref 12–15.9)
IMM GRANULOCYTES # BLD AUTO: 0.27 10*3/MM3 (ref 0–0.05)
IMM GRANULOCYTES NFR BLD AUTO: 2.8 % (ref 0–0.5)
LYMPHOCYTES # BLD AUTO: 1.76 10*3/MM3 (ref 0.7–3.1)
LYMPHOCYTES NFR BLD AUTO: 18.3 % (ref 19.6–45.3)
MCH RBC QN AUTO: 28.9 PG (ref 26.6–33)
MCHC RBC AUTO-ENTMCNC: 33.8 G/DL (ref 31.5–35.7)
MCV RBC AUTO: 85.6 FL (ref 79–97)
MONOCYTES # BLD AUTO: 0.6 10*3/MM3 (ref 0.1–0.9)
MONOCYTES NFR BLD AUTO: 6.2 % (ref 5–12)
NEUTROPHILS NFR BLD AUTO: 6.91 10*3/MM3 (ref 1.7–7)
NEUTROPHILS NFR BLD AUTO: 72 % (ref 42.7–76)
NRBC BLD AUTO-RTO: 0.1 /100 WBC (ref 0–0.2)
PLATELET # BLD AUTO: 201 10*3/MM3 (ref 140–450)
PMV BLD AUTO: 9.6 FL (ref 6–12)
RBC # BLD AUTO: 3.67 10*6/MM3 (ref 3.77–5.28)
WBC NRBC COR # BLD: 9.61 10*3/MM3 (ref 3.4–10.8)

## 2023-10-12 PROCEDURE — 85025 COMPLETE CBC W/AUTO DIFF WBC: CPT | Performed by: OBSTETRICS & GYNECOLOGY

## 2023-10-12 RX ADMIN — DOCUSATE SODIUM 100 MG: 100 CAPSULE, LIQUID FILLED ORAL at 19:41

## 2023-10-12 RX ADMIN — SERTRALINE 25 MG: 25 TABLET, FILM COATED ORAL at 08:07

## 2023-10-12 RX ADMIN — ACETAMINOPHEN 650 MG: 325 TABLET, FILM COATED ORAL at 08:56

## 2023-10-12 RX ADMIN — DOCUSATE SODIUM 100 MG: 100 CAPSULE, LIQUID FILLED ORAL at 08:07

## 2023-10-12 RX ADMIN — IBUPROFEN 600 MG: 600 TABLET, FILM COATED ORAL at 14:13

## 2023-10-12 RX ADMIN — IBUPROFEN 600 MG: 600 TABLET, FILM COATED ORAL at 08:07

## 2023-10-12 RX ADMIN — IBUPROFEN 600 MG: 600 TABLET, FILM COATED ORAL at 19:41

## 2023-10-12 RX ADMIN — ACETAMINOPHEN 650 MG: 325 TABLET, FILM COATED ORAL at 15:29

## 2023-10-12 NOTE — LACTATION NOTE
P3T. Baby is sleepy at breast but patient is undeterred. Baby has had a good latch earlier . Mom has a PBP. No concerns at present and LC # on WB.  Lactation Consult Note    Evaluation Completed: 10/12/2023 09:43 EDT  Patient Name: Claudia Sierra  :  1990  MRN:  [id number]     REFERRAL  INFORMATION:                          Date of Referral: 10/12/23   Person Making Referral: lactation consultant  Maternal Reason for Referral: breastfeeding currently  Infant Reason for Referral: sleepy      MATERNAL ASSESSMENT:     Breast Shape: pendulous, round (10/12/23 0900)  Breast Density: soft (10/12/23 0900)  Areola: elastic (10/12/23 0900)  Nipples: everted (10/12/23 0900)                MATERNAL INFANT FEEDING:               Pain with Feeding: no (10/12/23 0900)                                                                                                        EQUIPMENT TYPE:  Breast Pump Type: double electric, personal (10/12/23 0900)                              BREAST PUMPING:          LACTATION REFERRALS:

## 2023-10-12 NOTE — PLAN OF CARE
Goal Outcome Evaluation:      VSS, voiding spontaneously, ambulating independently, pain adequately controlled, breastfeeding.

## 2023-10-12 NOTE — LACTATION NOTE
This note was copied from a baby's chart.  P3 T - new admission.  Mom has baby latched to her left breast now in a modified cradle hold.  Observed deep jaw rotations consistently over 10 mins.  Mom BF her first for only 10 days &  her 2nd for just under 2 yrs.  The only issue was that her 2nd was given 1 bottle when jaundiced & then had nipple preference.  Mom has a personal breast pump.    Education provided:  feeding cues; frequency/duration; diaper expectations; milk production in relation to infant's small stomach size; drops of colostrum being normal the first few days progressing to increasing amounts of milk & eventually full supply 3-5 days after delivery; & signs of good latch.  Verbalized understanding.     Mother denies questions/concerns at this time.  Encouraged to call for help when needed.  LC # on WB.

## 2023-10-12 NOTE — PROGRESS NOTES
[facility]  Vaginal Delivery Progress Note    Patient Name: Claudia Sierra  :  1990  MRN:  [id number]      Subjective   Postpartum Day 1: Vaginal Delivery of a female infant.     The patient feels well without complaints.  Her pain is well controlled.  Reports normal lochia.     The patient plans to breastfeed.    Objective     Vital Signs Range for the last 24 hours  Temperature: Temp:  [97.1 øF (36.2 øC)-98.6 øF (37 øC)] 98 øF (36.7 øC)       BP: BP: ()/() 124/78   Pulse: Heart Rate:  [] 89   Respirations: Resp:  [16] 16                       Physical Exam:  General: Awake and alert  Abdomen: Fundus: firm, non tender, 1 below umbilicus  Extremities:  trace edema, NT     Labs:     Results from last 7 days   Lab Units 10/12/23  0623 10/11/23  0748   WBC 10*3/mm3 9.61 8.38   HEMOGLOBIN g/dL 10.6* 11.8*   HEMATOCRIT % 31.4* 34.5   PLATELETS 10*3/mm3 201 237       Prenatal labs results reviewed:  Yes   Rubella:  Equivocal, will receive MMR booster prior to discharge  Rh Status:    RH type   Date Value Ref Range Status   10/11/2023 Positive  Final         Assessment & Plan  : 1. PPD1 S/P  - Doing well, continue usual cares.           Pregnancy          Yessenia Higueraguera, APRN  10/12/2023  12:38 EDT

## 2023-10-13 VITALS
DIASTOLIC BLOOD PRESSURE: 80 MMHG | WEIGHT: 197.8 LBS | TEMPERATURE: 98.3 F | HEART RATE: 82 BPM | HEIGHT: 62 IN | OXYGEN SATURATION: 100 % | BODY MASS INDEX: 36.4 KG/M2 | SYSTOLIC BLOOD PRESSURE: 119 MMHG | RESPIRATION RATE: 16 BRPM

## 2023-10-13 RX ORDER — IBUPROFEN 800 MG/1
800 TABLET ORAL EVERY 8 HOURS PRN
Qty: 30 TABLET | Refills: 0 | Status: SHIPPED | OUTPATIENT
Start: 2023-10-13 | End: 2023-10-27

## 2023-10-13 RX ADMIN — SERTRALINE 25 MG: 25 TABLET, FILM COATED ORAL at 07:55

## 2023-10-13 RX ADMIN — ACETAMINOPHEN 650 MG: 325 TABLET, FILM COATED ORAL at 00:12

## 2023-10-13 RX ADMIN — Medication: at 09:42

## 2023-10-13 RX ADMIN — ACETAMINOPHEN 650 MG: 325 TABLET, FILM COATED ORAL at 07:59

## 2023-10-13 RX ADMIN — IBUPROFEN 600 MG: 600 TABLET, FILM COATED ORAL at 03:47

## 2023-10-13 NOTE — PLAN OF CARE
Goal Outcome Evaluation:      VSS, voiding spontaneously, ambulating independently, breastfeeding, pain controlled with Motrin and Tylenol. Lochia and fundus WNL.

## 2023-10-13 NOTE — DISCHARGE SUMMARY
Date of Discharge:  10/13/2023    Discharge Diagnosis: pregnancy s/p vaginal delivery with compound hand presentation    Presenting Problem/History of Present Illness  Pregnancy [Z34.90]       Hospital Course  Patient is a 33 y.o. female presented with pregnancy at 39w5d gestation for pitocin induction at full term. She underwent induction and spontaneous vaginal delivery complicated by compound hand presentation. Female infant, weighin 3245g, apgars 8/9. Postpartum course was uncomplicated  and pt and infant were both meeting all criteria for discharge on PPD 2.    Procedures Performed   on 10/11/23 @ 1705 by Dr. Anderson       Consults:   Consults       No orders found from 2023 to 10/12/2023.            Condition on Discharge:   Subjective   Postpartum Day 2 Vaginal Delivery.    The patient feels well without complaints.    Vital Signs  Temp:  [98.2 øF (36.8 øC)-98.3 øF (36.8 øC)] 98.3 øF (36.8 øC)  Heart Rate:  [82-86] 82  Resp:  [16-18] 16  BP: (109-119)/(72-80) 119/80    Physical Exam:   General: Awake and alert   Abdomen: Fundus: firm, non tender    Extremities:  Calves NT bilaterally    Assessment & Plan     PPD2  S/P  -   Stable for discharge. Instructions reviewed      Discharge Disposition  Home or Self Care    Discharge Medications     Discharge Medications        New Medications        Instructions Start Date   ibuprofen 800 MG tablet  Commonly known as: ADVIL,MOTRIN   800 mg, Oral, Every 8 Hours PRN             Continue These Medications        Instructions Start Date   sertraline 25 MG tablet  Commonly known as: ZOLOFT   Take 1 tablet by mouth Daily.             Stop These Medications      doxylamine 25 MG tablet  Commonly known as: UNISOM     prenatal (CLASSIC) vitamin 28-0.8 MG tablet tablet  Generic drug: prenatal vitamin                The patient has been prescribed a controlled substance.  She has been counseled on the risks associated with using the medication.   The addictive  potential of this medication and alternatives were discussed carefully with this patient and she demonstrated understanding.  A DIANNE report has been obtained and reviewed.       Activity at Discharge: restrictions reviewed    Follow-up Appointments  No future appointments.      Test Results Pending at Discharge       Berta Serrano MD  10/13/23  11:50 EDT

## 2024-02-12 NOTE — PLAN OF CARE
Problem: Patient Care Overview  Goal: Plan of Care Review  Outcome: Ongoing (interventions implemented as appropriate)  Flowsheets (Taken 2020 1048)  Progress: improving  Plan of Care Reviewed With: patient; spouse  Outcome Summary: s/p vaginal delivery, plan to transfer to motherbaby after 2 hour recovery period  Goal: Individualization and Mutuality  Outcome: Ongoing (interventions implemented as appropriate)  Flowsheets  Taken 2020 1048 by Amaya Oshea RN  Patient Specific Goals (Include Timeframe): healthy baby, pain management  Patient Specific Interventions: epidural for pain, breastfeeding assistance  What Information Would Help Us Give You More Personalized Care?: pt had seizures, a brain bleed, and bacterial meningitis 10 days after last delivery  How Would You and/or Your Support Person Like to Participate in Your Care?: stay @ [facility]  Patient Specific Preferences: breastfeeding, LOUIS  Taken 2020 0613 by Evy Hylton RN  How to Address Anxieties/Fears: Establish trusting relationship with pt and family; listen to concerns and be sensitive to fears  What Anxieties, Fears, Concerns, or Questions Do You Have About Your Care?: Near death experience 10 days PP with first pregnancy  Goal: Discharge Needs Assessment  Outcome: Ongoing (interventions implemented as appropriate)  Goal: Interprofessional Rounds/Family Conf  Outcome: Ongoing (interventions implemented as appropriate)     Problem: Fall Risk,  (Adult,Obstetrics,Pediatric)  Goal: Identify Related Risk Factors and Signs and Symptoms  Outcome: Ongoing (interventions implemented as appropriate)  Flowsheets (Taken 2020 1048)  Related Risk Factors (Fall Risk, ): pregnancy weight gain; prolonged bed rest; regional anesthesia  Signs and Symptoms (Fall Risk, ): presence of fall risk factors; increased risk of  fall/drop  Goal: Absence of Maternal Fall  Outcome: Ongoing (interventions implemented as  Called pt made an appt, pt needs morning appt due to work and did not want to come in on , at 2pm. So I found the first morning appt I could give pt for a cpx.     Pt would like the order added to her chart because she has a  line to get this done by the end of the month, stated to pt I dont know if we will be able to because dr. Guido has not seen you. Please advise    appropriate)  Flowsheets (Taken 2020 1048)  Absence of Maternal Fall: achieves outcome  Goal: Absence of [place] Fall/Drop  Outcome: Ongoing (interventions implemented as appropriate)  Flowsheets (Taken 2020 104)  Absence of [place] Fall/Drop: achieves outcome     Problem: Skin Injury Risk (Adult)  Goal: Identify Related Risk Factors and Signs and Symptoms  Outcome: Ongoing (interventions implemented as appropriate)  Flowsheets (Taken 2020 1048)  Related Risk Factors (Skin Injury Risk): hospitalization prolonged  Goal: Skin Health and Integrity  Outcome: Ongoing (interventions implemented as appropriate)  Flowsheets (Taken 2020 104)  Skin Health and Integrity: achieves outcome     Problem: Anesthesia/Analgesia, Neuraxial (Obstetrics)  Goal: Signs and Symptoms of Listed Potential Problems Will be Absent, Minimized or Managed (Anesthesia/Analgesia, Neuraxial)  Outcome: Ongoing (interventions implemented as appropriate)  Flowsheets (Taken 2020 104)  Problems Assessed (Neuraxial Anesthesia/Analgesia, OB): all  Problems Present (Neuraxial Anesth OB): none      no

## 2025-01-06 ENCOUNTER — PATIENT ROUNDING (BHMG ONLY) (OUTPATIENT)
Dept: URGENT CARE | Facility: CLINIC | Age: 35
End: 2025-01-06
Payer: COMMERCIAL

## 2025-01-06 NOTE — ED NOTES
Thank you for letting us care for you in your recent visit to our [place] urgent care center. We would love to hear about your experience with us. We hope you had a great visit.     We’re always looking for ways to make our patients’ experiences even better. Do you have any recommendations on ways we may improve?     I appreciate you taking the time to respond. Please be on the lookout for a survey about your recent visit from [employer] via text or email. We would greatly appreciate if you could fill that out and turn it back in. We want your voice to be heard and we value your feedback.   Thank you for choosing [facility] for your healthcare needs.     If you have concerns or would like to speak to me in person regarding your visit please feel free to give me a call. [phone].    Hope you get well soon and thank you.    Patrice Henriquez LPN Practice Manager